# Patient Record
Sex: FEMALE | Race: WHITE | NOT HISPANIC OR LATINO | Employment: FULL TIME | URBAN - METROPOLITAN AREA
[De-identification: names, ages, dates, MRNs, and addresses within clinical notes are randomized per-mention and may not be internally consistent; named-entity substitution may affect disease eponyms.]

---

## 2017-01-19 DIAGNOSIS — N95.1 SYMPTOMATIC MENOPAUSAL OR FEMALE CLIMACTERIC STATES: Primary | ICD-10-CM

## 2017-01-19 RX ORDER — CITALOPRAM HYDROBROMIDE 20 MG/1
20 TABLET ORAL DAILY
Qty: 90 TABLET | Refills: 1 | Status: SHIPPED | OUTPATIENT
Start: 2017-01-19 | End: 2017-07-20

## 2017-01-19 NOTE — TELEPHONE ENCOUNTER
Prescription approved per [facility] Refill Protocol or patient Primary care provider (PCP)  DARLING Mckeon RN/Abraham Benitez

## 2017-05-18 ENCOUNTER — TRANSFERRED RECORDS (OUTPATIENT)
Dept: HEALTH INFORMATION MANAGEMENT | Facility: CLINIC | Age: 47
End: 2017-05-18

## 2017-06-22 ENCOUNTER — OFFICE VISIT (OUTPATIENT)
Dept: FAMILY MEDICINE | Facility: CLINIC | Age: 47
End: 2017-06-22
Payer: OTHER MISCELLANEOUS

## 2017-06-22 VITALS
TEMPERATURE: 97.8 F | WEIGHT: 192.4 LBS | DIASTOLIC BLOOD PRESSURE: 68 MMHG | BODY MASS INDEX: 32.06 KG/M2 | HEIGHT: 65 IN | HEART RATE: 68 BPM | SYSTOLIC BLOOD PRESSURE: 120 MMHG

## 2017-06-22 DIAGNOSIS — S83.271S COMPLEX TEAR OF LATERAL MENISCUS OF RIGHT KNEE AS CURRENT INJURY, SEQUELA: ICD-10-CM

## 2017-06-22 DIAGNOSIS — Z01.818 PREOP GENERAL PHYSICAL EXAM: Primary | ICD-10-CM

## 2017-06-22 LAB — HGB BLD-MCNC: 12.9 G/DL (ref 11.7–15.7)

## 2017-06-22 PROCEDURE — 99214 OFFICE O/P EST MOD 30 MIN: CPT | Performed by: NURSE PRACTITIONER

## 2017-06-22 PROCEDURE — 85018 HEMOGLOBIN: CPT | Performed by: NURSE PRACTITIONER

## 2017-06-22 PROCEDURE — 36415 COLL VENOUS BLD VENIPUNCTURE: CPT | Performed by: NURSE PRACTITIONER

## 2017-06-22 NOTE — PROGRESS NOTES
[facility]  [address] [address]  [phone]  Dept: [phone]    PRE-OP EVALUATION:  Today's date: 2017    Becky Crane (: 1970) presents for pre-operative evaluation assessment as requested by Dr. Solis.  She requires evaluation and anesthesia risk assessment prior to undergoing surgery/procedure for treatment of right knee DJD. Meniscal tear,  .  Proposed procedure: partial right knee replacement    Date of Surgery/ Procedure: 17  Time of Surgery/ Procedure: 1:00pm  Hospital/Surgical Facility: [facility]  Fax number for surgical facility: [phone]  Primary Physician: Maricarmen Peoples  Type of Anesthesia Anticipated: General    Patient has a Health Care Directive or Living Will:  NO    1. NO - Do you have a history of heart attack, stroke, stent, bypass or surgery on an artery in the head, neck, heart or legs?  2. NO - Do you ever have any pain or discomfort in your chest?  3. NO - Do you have a history of  Heart Failure?  4. NO - Are you troubled by shortness of breath when: walking on the level, up a slight hill or at night?  5. NO - Do you currently have a cold, bronchitis or other respiratory infection?  6. NO - Do you have a cough, shortness of breath or wheezing?  7. NO - Do you sometimes get pains in the calves of your legs when you walk?  8. NO - Do you or anyone in your family have previous history of blood clots?  9. NO - Do you or does anyone in your family have a serious bleeding problem such as prolonged bleeding following surgeries or cuts?  10. NO - Have you ever had problems with anemia or been told to take iron pills?  11. NO - Have you had any abnormal blood loss such as black, tarry or bloody stools, or abnormal vaginal bleeding?  12. NO - Have you ever had a blood transfusion?  13. NO - Have you or any of your relatives ever had problems with anesthesia?  14. YES - Do you have sleep apnea, excessive  snoring or daytime drowsiness?snoring  15. NO - Do you have any prosthetic heart valves?  16. NO - Do you have prosthetic joints?  17. NO - Is there any chance that you may be pregnant?      HPI:                                                      Brief HPI related to upcoming procedure: has had a lot of right knee pain .    does have a lot of arthritis and recurrent meniscal tear.        See problem list for active medical problems.  Problems all longstanding and stable, except as noted/documented.  See ROS for pertinent symptoms related to these conditions.                                                                                                  .    MEDICAL HISTORY:                                                      Patient Active Problem List    Diagnosis Date Noted     Family history of diabetes mellitus 08/17/2016     Priority: Medium     Right knee meniscal tear 10/01/2014     Priority: Medium     Vitamin D deficiency 06/27/2014     Priority: Medium     Problem list name updated by automated process. Provider to review       Symptomatic menopausal or female climacteric states 06/27/2014     Priority: Medium     Environmental allergies 06/27/2014     Priority: Medium     CARDIOVASCULAR SCREENING; LDL GOAL LESS THAN 160 10/31/2010     Priority: Medium     Acute reaction to stress 08/29/2007     Priority: Medium     Problem list name updated by automated process. 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     Past Surgical History:   Procedure Laterality Date     C NONSPECIFIC PROCEDURE      tubes in ears, had brain damage during the surgery due to anesthetic complication     C NONSPECIFIC PROCEDURE      knee surgery x 2     CHOLECYSTECTOMY, LAPOROSCOPIC  2007     "Cholecystectomy, Laparoscopic     TONSILLECTOMY      as a child     Current Outpatient Prescriptions   Medication Sig Dispense Refill     citalopram (CELEXA) 20 MG tablet Take 1 tablet (20 mg) by mouth daily 90 tablet 1     OMEPRAZOLE PO        aspirin 81 MG tablet Take 81 mg by mouth daily       OTC products: no recent use of OTC ASA, NSAIDS or Steroids    Allergies   Allergen Reactions     Ivp Dye [Contrast Dye]       Latex Allergy: NO    Social History   Substance Use Topics     Smoking status: Never Smoker     Smokeless tobacco: Never Used     Alcohol use No     History   Drug Use No       REVIEW OF SYSTEMS:                                                    C: NEGATIVE for fever, chills, change in weight  I: NEGATIVE for worrisome rashes, moles or lesions  E: NEGATIVE for vision changes or irritation  E/M: NEGATIVE for ear, mouth and throat problems  R: NEGATIVE for significant cough or SOB  CV: NEGATIVE for chest pain, palpitations or peripheral edema  GI: NEGATIVE for nausea, abdominal pain, heartburn, or change in bowel habits  : NEGATIVE for frequency, dysuria, or hematuria  MUSCULOSKELETAL:POSITIVE  for joint pain right knee pain   N: NEGATIVE for weakness, dizziness or paresthesias  E: NEGATIVE for temperature intolerance, skin/hair changes  H: NEGATIVE for bleeding problems  P: NEGATIVE for changes in mood or affect    EXAM:                                                    /68 (BP Location: Left arm, Patient Position: Chair, Cuff Size: Adult Regular)  Pulse 68  Temp 97.8  F (36.6  C) (Tympanic)  Ht 5' 4.75\" (1.645 m)  Wt 192 lb 6.4 oz (87.3 kg)  LMP  (LMP Unknown)  BMI 32.26 kg/m2    GENERAL APPEARANCE: healthy, alert and no distress     EYES: Eyes grossly normal to inspection, PERRL and eyes track well      HENT: ear canals and TM's normal, nose and mouth without ulcers or lesions, oral mucous membranes moist, oropharynx clear and teeth in good repair      NECK: no adenopathy, no " asymmetry, masses, or scars and thyroid normal to palpation     RESP: lungs clear to auscultation - no rales, rhonchi or wheezes     CV: regular rates and rhythm, normal S1 S2, no S3 or S4 and no murmur, click or rub     ABDOMEN:  soft, nontender, no HSM or masses and bowel sounds normal     MS: right knee pain .       SKIN: no suspicious lesions or rashes     NEURO: Normal strength and tone, sensory exam grossly normal, mentation intact, speech normal, cranial nerves 2-12 intact, Romberg negative and proprioception normal     PSYCH: mentation appears normal. and affect normal/bright     LYMPHATICS: No axillary, cervical, or supraclavicular nodes    DIAGNOSTICS:                                                    Hemoglobin (indicated for history of anemia or procedure with significant blood loss such as tonsillectomy, major intraperitoneal surgery, vascular surgery, major spine surgery, total joint replacement) 12.0    Recent Labs   Lab Test  08/17/16   1224  09/29/15   1405  10/01/14   1408  06/27/14   1422   HGB   --   13.7  14.1   --    NA  137   --    --   139   POTASSIUM  4.6   --    --   4.2   CR  0.74   --    --   0.78   A1C  5.3   --    --    --         IMPRESSION:                                                    Reason for surgery/procedure:  treatment of right knee DJD. Meniscal tear,  .  Proposed procedure: partial right knee replacement      The proposed surgical procedure is considered INTERMEDIATE risk.    REVISED CARDIAC RISK INDEX  The patient has the following serious cardiovascular risks for perioperative complications such as (MI, PE, VFib and 3  AV Block):  No serious cardiac risks  INTERPRETATION: 0 risks: Class I (very low risk - 0.4% complication rate)    The patient has the following additional risks for perioperative complications:  No identified additional risks    ASSESSMENT/PLAN:      ICD-10-CM    1. Preop general physical exam Z01.818 Hemoglobin   2. Complex tear of lateral meniscus of  right knee as current injury, sequela S83.271S        Patient Instructions     Before Your Surgery      Call your surgeon if there is any change in your health. This includes signs of a cold or flu (such as a sore throat, runny nose, cough, rash or fever).    Do not smoke, drink alcohol or take over the counter medicine (unless your surgeon or primary care doctor tells you to) for the 24 hours before and after surgery.    If you take prescribed drugs: Follow your doctor s orders about which medicines to take and which to stop until after surgery.    Eating and drinking prior to surgery: follow the instructions from your surgeon    Take a shower or bath the night before surgery. Use the soap your surgeon gave you to gently clean your skin. If you do not have soap from your surgeon, use your regular soap. Do not shave or scrub the surgery site.  Wear clean pajamas and have clean sheets on your bed.     Be well rested and well hydrated the night before surgery                 RECOMMENDATIONS:                                                          --Patient is to take all scheduled medications on the day of surgery EXCEPT for modifications listed below.    APPROVAL GIVEN to proceed with proposed procedure, without further diagnostic evaluation       Signed Electronically by: RAJIV BARRERA NP, APRN CNP    Copy of this evaluation report is provided to requesting physician.    [facility] Preop Guidelines

## 2017-06-22 NOTE — NURSING NOTE
"Chief Complaint   Patient presents with     Pre-Op Exam       Initial /68 (BP Location: Left arm, Patient Position: Chair, Cuff Size: Adult Regular)  Pulse 68  Temp 97.8  F (36.6  C) (Tympanic)  Ht 5' 4.75\" (1.645 m)  Wt 192 lb 6.4 oz (87.3 kg)  LMP  (LMP Unknown)  BMI 32.26 kg/m2 Estimated body mass index is 32.26 kg/(m^2) as calculated from the following:    Height as of this encounter: 5' 4.75\" (1.645 m).    Weight as of this encounter: 192 lb 6.4 oz (87.3 kg).  Medication Reconciliation: complete     April ELIAN Arango      "

## 2017-06-22 NOTE — MR AVS SNAPSHOT
After Visit Summary   6/22/2017    Becky Crane    MRN: [id number]           Patient Information     Date Of Birth          1970        Visit Information        Provider Department      6/22/2017 4:00 PM Maricarmen Peoples APRN [facility]        Today's Diagnoses     Preop general physical exam    -  1    Complex tear of lateral meniscus of right knee as current injury, sequela          Care Instructions      Before Your Surgery      Call your surgeon if there is any change in your health. This includes signs of a cold or flu (such as a sore throat, runny nose, cough, rash or fever).    Do not smoke, drink alcohol or take over the counter medicine (unless your surgeon or primary care doctor tells you to) for the 24 hours before and after surgery.    If you take prescribed drugs: Follow your doctor s orders about which medicines to take and which to stop until after surgery.    Eating and drinking prior to surgery: follow the instructions from your surgeon    Take a shower or bath the night before surgery. Use the soap your surgeon gave you to gently clean your skin. If you do not have soap from your surgeon, use your regular soap. Do not shave or scrub the surgery site.  Wear clean pajamas and have clean sheets on your bed.     Be well rested and well hydrated the night before surgery               Follow-ups after your visit        Who to contact     Normal or non-critical lab and imaging results will be communicated to you by MyChart, letter or phone within 4 business days after the clinic has received the results. If you do not hear from us within 7 days, please contact the clinic through MyChart or phone. If you have a critical or abnormal lab result, we will notify you by phone as soon as possible.  Submit refill requests through [employer] or call your pharmacy and they will forward the refill request to us. Please allow 3 business days for your refill to be  "completed.          If you need to speak with a  for additional information , please call: [phone]           Additional Information About Your Visit        [employer] Information     "[employer] gives you secure access to your electronic health record. If you see a primary care provider, you can also send messages to your care team and make appointments. If you have questions, please call your primary care clinic.  If you do not have a primary care provider, please call [phone] and they will assist you.        Care EveryWhere ID     This is your Care EveryWhere ID. This could be used by other organizations to access your [place] medical records  [id number]        Your Vitals Were     Pulse Temperature Height Last Period BMI (Body Mass Index)       68 97.8  F (36.6  C) (Tympanic) 5' 4.75\" (1.645 m) (LMP Unknown) 32.26 kg/m2        Blood Pressure from Last 3 Encounters:   06/22/17 120/68   08/17/16 128/74   06/08/16 124/70    Weight from Last 3 Encounters:   06/22/17 192 lb 6.4 oz (87.3 kg)   08/17/16 194 lb 6.4 oz (88.2 kg)   06/08/16 193 lb (87.5 kg)              We Performed the Following     Hemoglobin        Primary Care Provider Office Phone # Fax #    [facility] [phone]       [facility] [address] [place] [address]        Equal Access to Services     DEJUAN GARYA : Hadii emma hansono Sodon, waaxda luqadaha, qaybta kaalmada eulaliayarebecca, clifford herbert. So [facility] [phone].    ATENCIÓN: Si habla español, tiene a easley disposición servicios gratuitos de asistencia lingüística. Llame al [phone].    We comply with applicable federal civil rights laws and [place] laws. We do not discriminate on the basis of race, color, national origin, age, disability sex, sexual orientation or gender identity.            Thank you!     Thank you for choosing [facility]  for your care. Our goal is " always to provide you with excellent care. Hearing back from our patients is one way we can continue to improve our services. Please take a few minutes to complete the written survey that you may receive in the mail after your visit with us. Thank you!             Your Updated Medication List - Protect others around you: Learn how to safely use, store and throw away your medicines at www.disposemymeds.org.          This list is accurate as of: 6/22/17  4:59 PM.  Always use your most recent med list.                   Brand Name Dispense Instructions for use Diagnosis    aspirin 81 MG tablet      Take 81 mg by mouth daily        citalopram 20 MG tablet    celeXA    90 tablet    Take 1 tablet (20 mg) by mouth daily    Symptomatic menopausal or female climacteric states       OMEPRAZOLE PO

## 2017-06-22 NOTE — PATIENT INSTRUCTIONS
Before Your Surgery      Call your surgeon if there is any change in your health. This includes signs of a cold or flu (such as a sore throat, runny nose, cough, rash or fever).    Do not smoke, drink alcohol or take over the counter medicine (unless your surgeon or primary care doctor tells you to) for the 24 hours before and after surgery.    If you take prescribed drugs: Follow your doctor s orders about which medicines to take and which to stop until after surgery.    Eating and drinking prior to surgery: follow the instructions from your surgeon    Take a shower or bath the night before surgery. Use the soap your surgeon gave you to gently clean your skin. If you do not have soap from your surgeon, use your regular soap. Do not shave or scrub the surgery site.  Wear clean pajamas and have clean sheets on your bed.     Be well rested and well hydrated the night before surgery

## 2017-06-26 ENCOUNTER — TRANSFERRED RECORDS (OUTPATIENT)
Dept: HEALTH INFORMATION MANAGEMENT | Facility: CLINIC | Age: 47
End: 2017-06-26

## 2017-07-11 ENCOUNTER — TRANSFERRED RECORDS (OUTPATIENT)
Dept: HEALTH INFORMATION MANAGEMENT | Facility: CLINIC | Age: 47
End: 2017-07-11

## 2017-07-20 DIAGNOSIS — N95.1 SYMPTOMATIC MENOPAUSAL OR FEMALE CLIMACTERIC STATES: ICD-10-CM

## 2017-07-20 NOTE — TELEPHONE ENCOUNTER
Citalopram  20mg     Last Written Prescription Date: 01/19/2017 #90 x 1  Last filled 04/24/2017  Last Office Visit with [facility] primary care provider:  06/22/2017 PK Peoples        Last PHQ-9 score on record= No flowsheet data found.

## 2017-07-21 RX ORDER — CITALOPRAM HYDROBROMIDE 20 MG/1
TABLET ORAL
Qty: 90 TABLET | Refills: 2 | Status: SHIPPED | OUTPATIENT
Start: 2017-07-21 | End: 2018-07-11

## 2017-07-21 NOTE — TELEPHONE ENCOUNTER
Prescription approved per [facility] Refill Protocol.  Mikki Davidson RN

## 2017-08-15 ENCOUNTER — TRANSFERRED RECORDS (OUTPATIENT)
Dept: HEALTH INFORMATION MANAGEMENT | Facility: CLINIC | Age: 47
End: 2017-08-15

## 2017-08-24 ENCOUNTER — OFFICE VISIT (OUTPATIENT)
Dept: FAMILY MEDICINE | Facility: CLINIC | Age: 47
End: 2017-08-24
Payer: OTHER MISCELLANEOUS

## 2017-08-24 VITALS
HEIGHT: 65 IN | BODY MASS INDEX: 32.65 KG/M2 | DIASTOLIC BLOOD PRESSURE: 72 MMHG | HEART RATE: 80 BPM | WEIGHT: 196 LBS | SYSTOLIC BLOOD PRESSURE: 110 MMHG | TEMPERATURE: 96.7 F

## 2017-08-24 DIAGNOSIS — G89.18 POSTOPERATIVE PAIN OF RIGHT KNEE: ICD-10-CM

## 2017-08-24 DIAGNOSIS — M25.561 POSTOPERATIVE PAIN OF RIGHT KNEE: ICD-10-CM

## 2017-08-24 DIAGNOSIS — Z01.818 PREOP GENERAL PHYSICAL EXAM: Primary | ICD-10-CM

## 2017-08-24 DIAGNOSIS — L90.5 SCAR TISSUE: ICD-10-CM

## 2017-08-24 LAB — HGB BLD-MCNC: 12.6 G/DL (ref 11.7–15.7)

## 2017-08-24 PROCEDURE — 85018 HEMOGLOBIN: CPT | Performed by: NURSE PRACTITIONER

## 2017-08-24 PROCEDURE — 99214 OFFICE O/P EST MOD 30 MIN: CPT | Performed by: NURSE PRACTITIONER

## 2017-08-24 PROCEDURE — 36415 COLL VENOUS BLD VENIPUNCTURE: CPT | Performed by: NURSE PRACTITIONER

## 2017-08-24 NOTE — PATIENT INSTRUCTIONS
Before Your Surgery      Call your surgeon if there is any change in your health. This includes signs of a cold or flu (such as a sore throat, runny nose, cough, rash or fever).    Do not smoke, drink alcohol or take over the counter medicine (unless your surgeon or primary care doctor tells you to) for the 24 hours before and after surgery.    If you take prescribed drugs: Follow your doctor s orders about which medicines to take and which to stop until after surgery.    Eating and drinking prior to surgery: follow the instructions from your surgeon    Take a shower or bath the night before surgery. Use the soap your surgeon gave you to gently clean your skin. If you do not have soap from your surgeon, use your regular soap. Do not shave or scrub the surgery site.  Wear clean pajamas and have clean sheets on your bed.     Be well rested and well hydrated the night before surgery     Brush your teeth in the am and rinse mouth  NO  Fluids or foods in the AM

## 2017-08-24 NOTE — NURSING NOTE
"Chief Complaint   Patient presents with     Pre-Op Exam       Initial /72  Pulse 80  Temp 96.7  F (35.9  C) (Tympanic)  Ht 5' 5\" (1.651 m)  Wt 196 lb (88.9 kg)  BMI 32.62 kg/m2 Estimated body mass index is 32.62 kg/(m^2) as calculated from the following:    Height as of this encounter: 5' 5\" (1.651 m).    Weight as of this encounter: 196 lb (88.9 kg).  Medication Reconciliation: camron Grimaldo CMA      "

## 2017-08-24 NOTE — PROGRESS NOTES
[facility]  [address] [address]  [phone]  Dept: [phone]    PRE-OP EVALUATION:  Today's date: 2017    Becky Crane (: 1970) presents for pre-operative evaluation assessment as requested by Dr. Solis .  She requires evaluation and anesthesia risk assessment prior to undergoing surgery/procedure for treatment of right knee pain due to scar tissue  .  Proposed procedure: right knee manipulation to break the scar tissue     Date of Surgery/ Procedure: 17  Time of Surgery/ Procedure: 7am  H[facility]Surgical Facility: [facility]  Fax number for surgical facility:   Primary Physician: Maricarmen Peoples  Type of Anesthesia Anticipated: to be determined    Patient has a Health Care Directive or Living Will:  NO    1. NO - Do you have a history of heart attack, stroke, stent, bypass or surgery on an artery in the head, neck, heart or legs?  2. NO - Do you ever have any pain or discomfort in your chest?  3. NO - Do you have a history of  Heart Failure?  4. NO - Are you troubled by shortness of breath when: walking on the level, up a slight hill or at night?  5. NO - Do you currently have a cold, bronchitis or other respiratory infection?  6. NO - Do you have a cough, shortness of breath or wheezing?  7. NO - Do you sometimes get pains in the calves of your legs when you walk?  8. NO - Do you or anyone in your family have previous history of blood clots?  9. NO - Do you or does anyone in your family have a serious bleeding problem such as prolonged bleeding following surgeries or cuts?  10. NO - Have you ever had problems with anemia or been told to take iron pills?  11. NO - Have you had any abnormal blood loss such as black, tarry or bloody stools, or abnormal vaginal bleeding?  12. NO - Have you ever had a blood transfusion?  13. NO - Have you or any of your relatives ever had problems with anesthesia?  14. NO - Do you have sleep apnea,  excessive snoring or daytime drowsiness?  15. NO - Do you have any prosthetic heart valves?  16. Yes - Do you have prosthetic joints?  17. NO - Is there any chance that you may be pregnant?        HPI:                                                      Brief HPI related to upcoming procedure: right knee partial knee replacement and has been having problems with  Flexion and extension.   She needs to have manipulation to break the scar tissue       See problem list for active medical problems.  Problems all longstanding and stable, except as noted/documented.  See ROS for pertinent symptoms related to these conditions.                                                                                                  .    MEDICAL HISTORY:                                                    Patient Active Problem List    Diagnosis Date Noted     Family history of diabetes mellitus 08/17/2016     Priority: Medium     Right knee meniscal tear 10/01/2014     Priority: Medium     Vitamin D deficiency 06/27/2014     Priority: Medium     Problem list name updated by automated process. Provider to review       Symptomatic menopausal or female climacteric states 06/27/2014     Priority: Medium     Environmental allergies 06/27/2014     Priority: Medium     CARDIOVASCULAR SCREENING; LDL GOAL LESS THAN 160 10/31/2010     Priority: Medium     Acute reaction to stress 08/29/2007     Priority: Medium     Problem list name updated by automated process. 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     Past Surgical History:   Procedure Laterality Date     C NONSPECIFIC PROCEDURE      tubes in ears, had brain damage during the surgery due to anesthetic complication     C NONSPECIFIC PROCEDURE       "knee surgery x 2     CHOLECYSTECTOMY, LAPOROSCOPIC  2007    Cholecystectomy, Laparoscopic     TONSILLECTOMY      as a child     Current Outpatient Prescriptions   Medication Sig Dispense Refill     citalopram (CELEXA) 20 MG tablet TAKE 1 TABLET BY MOUTH DAILY 90 tablet 2     OMEPRAZOLE PO        aspirin 81 MG tablet Take 81 mg by mouth daily       OTC products: no recent use of OTC ASA, NSAIDS or Steroids    Allergies   Allergen Reactions     Iodine Hives     Ivp Dye [Contrast Dye]       Latex Allergy: NO    Social History   Substance Use Topics     Smoking status: Never Smoker     Smokeless tobacco: Never Used     Alcohol use No     History   Drug Use No       REVIEW OF SYSTEMS:                                                    C: NEGATIVE for fever, chills, change in weight  INTEGUMENTARY/SKIN: NEGATIVE for worrisome rashes, moles or lesions  EYES: NEGATIVE for vision changes or irritation  E/M: NEGATIVE for ear, mouth and throat problems  R: NEGATIVE for significant cough or SOB  CV: NEGATIVE for chest pain, palpitations or peripheral edema  GI: NEGATIVE for nausea, abdominal pain, heartburn, or change in bowel habits  : negative for urinary issues  MUSCULOSKELETAL: POSITIVE  for right knee pain   NEURO: NEGATIVE for weakness, dizziness or paresthesias  ENDOCRINE: NEGATIVE for temperature intolerance, skin/hair changes  PSYCHIATRIC: NEGATIVE for changes in mood or affect    EXAM:                                                    /72  Pulse 80  Temp 96.7  F (35.9  C) (Tympanic)  Ht 5' 5\" (1.651 m)  Wt 196 lb (88.9 kg)  BMI 32.62 kg/m2    GENERAL APPEARANCE: healthy, alert and no distress     EYES: Eyes grossly normal to inspection, PERRL and eyes track well      HENT: ear canals and TM's normal, nose and mouth without ulcers or lesions and teeth in good repair      NECK: no adenopathy, no asymmetry, masses, or scars and thyroid normal to palpation     RESP: lungs clear to auscultation - no rales, " rhonchi or wheezes     CV: regular rates and rhythm, normal S1 S2, no S3 or S4 and no murmur, click or rub     ABDOMEN:  soft, nontender, no HSM or masses and bowel sounds normal     MS: extremities normal- no gross deformities noted, no evidence of inflammation in joints, FROM in all extremities.     SKIN: no suspicious lesions or rashes     NEURO: Normal strength and tone, sensory exam grossly normal, mentation intact, speech normal, cranial nerves 2-12 intact, Romberg negative, rapid alternating movements normal and proprioception normal     PSYCH: mentation appears normal. and affect normal/bright     LYMPHATICS: No axillary, cervical, or supraclavicular nodes    DIAGNOSTICS:                                                    Hemoglobin (indicated for history of anemia or procedure with significant blood loss such as tonsillectomy, major intraperitoneal surgery, vascular surgery, major spine surgery, total joint replacement) 12.6    Recent Labs   Lab Test  06/22/17   1614  08/17/16   1224  09/29/15   1405   06/27/14   1422   HGB  12.9   --   13.7   < >   --    NA   --   137   --    --   139   POTASSIUM   --   4.6   --    --   4.2   CR   --   0.74   --    --   0.78   A1C   --   5.3   --    --    --     < > = values in this interval not displayed.        IMPRESSION:                                                    Reason for surgery/procedure: right knee pain and limited ROM after partial knee replacement due to scar tissue     The proposed surgical procedure is considered LOW risk.    REVISED CARDIAC RISK INDEX  The patient has the following serious cardiovascular risks for perioperative complications such as (MI, PE, VFib and 3  AV Block):  No serious cardiac risks  INTERPRETATION: 0 risks: Class I (very low risk - 0.4% complication rate)    The patient has the following additional risks for perioperative complications:  No identified additional risks    ASSESSMENT/PLAN:      ICD-10-CM    1. Preop general  physical exam Z01.818 Hemoglobin   2. Postoperative pain of right knee M25.561     G89.18    3. Scar tissue L90.5        Patient Instructions     Before Your Surgery      Call your surgeon if there is any change in your health. This includes signs of a cold or flu (such as a sore throat, runny nose, cough, rash or fever).    Do not smoke, drink alcohol or take over the counter medicine (unless your surgeon or primary care doctor tells you to) for the 24 hours before and after surgery.    If you take prescribed drugs: Follow your doctor s orders about which medicines to take and which to stop until after surgery.    Eating and drinking prior to surgery: follow the instructions from your surgeon    Take a shower or bath the night before surgery. Use the soap your surgeon gave you to gently clean your skin. If you do not have soap from your surgeon, use your regular soap. Do not shave or scrub the surgery site.  Wear clean pajamas and have clean sheets on your bed.     Be well rested and well hydrated the night before surgery     Brush your teeth in the am and rinse mouth  NO  Fluids or foods in the AM             RECOMMENDATIONS:                                                          --she will hold her medication in the AM      APPROVAL GIVEN to proceed with proposed procedure, without further diagnostic evaluation       Signed Electronically by: RAJIV BARRERA NP, APRN CNP    Copy of this evaluation report is provided to requesting physician.    [facility] Preop Guidelines

## 2017-08-24 NOTE — MR AVS SNAPSHOT
After Visit Summary   8/24/2017    Becky Crane    MRN: [id number]           Patient Information     Date Of Birth          1970        Visit Information        Provider Department      8/24/2017 11:20 AM Maricarmen Peoples APRN [facility]        Today's Diagnoses     Preop general physical exam    -  1    Postoperative pain of right knee        Scar tissue          Care Instructions      Before Your Surgery      Call your surgeon if there is any change in your health. This includes signs of a cold or flu (such as a sore throat, runny nose, cough, rash or fever).    Do not smoke, drink alcohol or take over the counter medicine (unless your surgeon or primary care doctor tells you to) for the 24 hours before and after surgery.    If you take prescribed drugs: Follow your doctor s orders about which medicines to take and which to stop until after surgery.    Eating and drinking prior to surgery: follow the instructions from your surgeon    Take a shower or bath the night before surgery. Use the soap your surgeon gave you to gently clean your skin. If you do not have soap from your surgeon, use your regular soap. Do not shave or scrub the surgery site.  Wear clean pajamas and have clean sheets on your bed.     Be well rested and well hydrated the night before surgery     Brush your teeth in the am and rinse mouth  NO  Fluids or foods in the AM           Follow-ups after your visit        Who to contact     Normal or non-critical lab and imaging results will be communicated to you by [employer], letter or phone within 4 business days after the clinic has received the results. If you do not hear from us within 7 days, please contact the clinic through [employer] or phone. If you have a critical or abnormal lab result, we will notify you by phone as soon as possible.  Submit refill requests through [employer] or call your pharmacy and they will forward the refill request to us.  "Please allow 3 business days for your refill to be completed.          If you need to speak with a  for additional information , please call: [phone]           Additional Information About Your Visit        [employer] Information     [employer] gives you secure access to your electronic health record. If you see a primary care provider, you can also send messages to your care team and make appointments. If you have questions, please call your primary care clinic.  If you do not have a primary care provider, please call [phone] and they will assist you.        Care EveryWhere ID     This is your Care EveryWhere ID. This could be used by other organizations to access your [place] medical records  [id number]        Your Vitals Were     Pulse Temperature Height BMI (Body Mass Index)          80 96.7  F (35.9  C) (Tympanic) 5' 5\" (1.651 m) 32.62 kg/m2         Blood Pressure from Last 3 Encounters:   08/24/17 110/72   06/22/17 120/68   08/17/16 128/74    Weight from Last 3 Encounters:   08/24/17 196 lb (88.9 kg)   06/22/17 192 lb 6.4 oz (87.3 kg)   08/17/16 194 lb 6.4 oz (88.2 kg)              We Performed the Following     Hemoglobin        Primary Care Provider Office Phone # Fax #    [facility] [phone] [address] [place] [address]        Equal Access to Services     [facility]: Hadii aad ku hadasho Soomaali, waaxda luqadaha, qaybta kaalmada adeegyada, waxay nader wylie . So [facility] [phone].    ATENCIÓN: Si habla español, tiene a easley disposición servicios gratuitos de asistencia lingüística. Llame al [phone].    We comply with applicable federal civil rights laws and [place] laws. We do not discriminate on the basis of race, color, national origin, age, disability sex, sexual orientation or gender identity.            Thank you!     Thank you for choosing [facility] [place]  for your care. Our goal is " always to provide you with excellent care. Hearing back from our patients is one way we can continue to improve our services. Please take a few minutes to complete the written survey that you may receive in the mail after your visit with us. Thank you!             Your Updated Medication List - Protect others around you: Learn how to safely use, store and throw away your medicines at www.disposemymeds.org.          This list is accurate as of: 8/24/17 12:26 PM.  Always use your most recent med list.                   Brand Name Dispense Instructions for use Diagnosis    aspirin 81 MG tablet      Take 81 mg by mouth daily        citalopram 20 MG tablet    celeXA    90 tablet    TAKE 1 TABLET BY MOUTH DAILY    Symptomatic menopausal or female climacteric states       OMEPRAZOLE PO

## 2017-08-25 ENCOUNTER — TRANSFERRED RECORDS (OUTPATIENT)
Dept: HEALTH INFORMATION MANAGEMENT | Facility: CLINIC | Age: 47
End: 2017-08-25

## 2017-09-02 ENCOUNTER — HEALTH MAINTENANCE LETTER (OUTPATIENT)
Age: 47
End: 2017-09-02

## 2017-09-05 ENCOUNTER — TRANSFERRED RECORDS (OUTPATIENT)
Dept: HEALTH INFORMATION MANAGEMENT | Facility: CLINIC | Age: 47
End: 2017-09-05

## 2017-09-19 ENCOUNTER — TRANSFERRED RECORDS (OUTPATIENT)
Dept: HEALTH INFORMATION MANAGEMENT | Facility: CLINIC | Age: 47
End: 2017-09-19

## 2017-10-17 ENCOUNTER — TRANSFERRED RECORDS (OUTPATIENT)
Dept: HEALTH INFORMATION MANAGEMENT | Facility: CLINIC | Age: 47
End: 2017-10-17

## 2017-11-18 ENCOUNTER — TELEPHONE (OUTPATIENT)
Dept: FAMILY MEDICINE | Facility: CLINIC | Age: 47
End: 2017-11-18

## 2017-11-21 ENCOUNTER — TRANSFERRED RECORDS (OUTPATIENT)
Dept: HEALTH INFORMATION MANAGEMENT | Facility: CLINIC | Age: 47
End: 2017-11-21

## 2018-02-06 ENCOUNTER — TRANSFERRED RECORDS (OUTPATIENT)
Dept: HEALTH INFORMATION MANAGEMENT | Facility: CLINIC | Age: 48
End: 2018-02-06

## 2018-04-17 ENCOUNTER — TRANSFERRED RECORDS (OUTPATIENT)
Dept: HEALTH INFORMATION MANAGEMENT | Facility: CLINIC | Age: 48
End: 2018-04-17

## 2018-05-15 ENCOUNTER — TRANSFERRED RECORDS (OUTPATIENT)
Dept: HEALTH INFORMATION MANAGEMENT | Facility: CLINIC | Age: 48
End: 2018-05-15

## 2018-06-22 ENCOUNTER — TELEPHONE (OUTPATIENT)
Dept: FAMILY MEDICINE | Facility: CLINIC | Age: 48
End: 2018-06-22

## 2018-06-22 ENCOUNTER — TRANSFERRED RECORDS (OUTPATIENT)
Dept: HEALTH INFORMATION MANAGEMENT | Facility: CLINIC | Age: 48
End: 2018-06-22

## 2018-06-22 NOTE — TELEPHONE ENCOUNTER
Sounds like she needs an appointment. Maricarmen do you want to add order or have ortho physician do this or see patient? Neelam Tsai RN

## 2018-06-22 NOTE — TELEPHONE ENCOUNTER
I left a message for her to call back. Why does she want a CRP? She is due to see Maricarmen in August . I will not add this order in so when you find out why it is needed please forward to Maricarmen. Neelam Tsai RN

## 2018-06-22 NOTE — TELEPHONE ENCOUNTER
Pt reports that the ortho doctor told her to make sure there is no infection in her knee that may be causing the swelling.    Kaykay [place], [place]

## 2018-06-25 NOTE — TELEPHONE ENCOUNTER
Per Maricarmen -  Patient needs to be seen in clinic. Please make an appointment.  Shannan Maya RN

## 2018-06-26 NOTE — TELEPHONE ENCOUNTER
Patient called back. She is very upset and is not going to come in for an appointment. She will go to another provider to have the test ordered. Patient hung up.  Shannan Maya RN

## 2018-06-27 DIAGNOSIS — M25.561 RIGHT KNEE PAIN: Primary | ICD-10-CM

## 2018-06-27 LAB
BASOPHILS # BLD AUTO: 0.1 10E9/L (ref 0–0.2)
BASOPHILS NFR BLD AUTO: 1 %
CRP SERPL-MCNC: 10.3 MG/L (ref 0–8)
DIFFERENTIAL METHOD BLD: NORMAL
EOSINOPHIL # BLD AUTO: 0.2 10E9/L (ref 0–0.7)
EOSINOPHIL NFR BLD AUTO: 3.2 %
ERYTHROCYTE [DISTWIDTH] IN BLOOD BY AUTOMATED COUNT: 13.2 % (ref 10–15)
ERYTHROCYTE [SEDIMENTATION RATE] IN BLOOD BY WESTERGREN METHOD: 18 MM/H (ref 0–30)
HCT VFR BLD AUTO: 40 % (ref 35–47)
HGB BLD-MCNC: 13.6 G/DL (ref 11.7–15.7)
LYMPHOCYTES # BLD AUTO: 1.7 10E9/L (ref 0.8–5.3)
LYMPHOCYTES NFR BLD AUTO: 33.8 %
MCH RBC QN AUTO: 28.8 PG (ref 26.5–33)
MCHC RBC AUTO-ENTMCNC: 34 G/DL (ref 31.5–36.5)
MCV RBC AUTO: 85 FL (ref 78–100)
MONOCYTES # BLD AUTO: 0.4 10E9/L (ref 0–1.3)
MONOCYTES NFR BLD AUTO: 8.2 %
NEUTROPHILS # BLD AUTO: 2.7 10E9/L (ref 1.6–8.3)
NEUTROPHILS NFR BLD AUTO: 53.8 %
PLATELET # BLD AUTO: 214 10E9/L (ref 150–450)
RBC # BLD AUTO: 4.72 10E12/L (ref 3.8–5.2)
WBC # BLD AUTO: 5 10E9/L (ref 4–11)

## 2018-06-27 PROCEDURE — 85652 RBC SED RATE AUTOMATED: CPT | Performed by: PHYSICIAN ASSISTANT

## 2018-06-27 PROCEDURE — 36415 COLL VENOUS BLD VENIPUNCTURE: CPT | Performed by: PHYSICIAN ASSISTANT

## 2018-06-27 PROCEDURE — 85025 COMPLETE CBC W/AUTO DIFF WBC: CPT | Performed by: PHYSICIAN ASSISTANT

## 2018-06-27 PROCEDURE — 86140 C-REACTIVE PROTEIN: CPT | Performed by: PHYSICIAN ASSISTANT

## 2018-07-11 DIAGNOSIS — N95.1 SYMPTOMATIC MENOPAUSAL OR FEMALE CLIMACTERIC STATES: ICD-10-CM

## 2018-07-11 RX ORDER — CITALOPRAM HYDROBROMIDE 20 MG/1
TABLET ORAL
Qty: 90 TABLET | Refills: 0 | Status: SHIPPED | OUTPATIENT
Start: 2018-07-11 | End: 2018-10-28

## 2018-07-11 NOTE — TELEPHONE ENCOUNTER
Medication is being filled for 1 time refill only due to:   Over due for office visit and/or labs   DARLING Mckeon  RN/Abraham Benitez

## 2018-07-11 NOTE — TELEPHONE ENCOUNTER
"Requested Prescriptions   Pending Prescriptions Disp Refills     citalopram (CELEXA) 20 MG tablet [Pharmacy Med Name: Citalopram Hydrobromide Oral Tablet 20 MG] 90 tablet 0    Last Written Prescription Date:  7/21/17  Last Fill Quantity: 90,  # refills: 2   Last office visit: 8/24/2017 with prescribing provider:  8/24/17 gallo   Future Office Visit:     Sig: TAKE ONE TABLET BY MOUTH ONE TIME DAILY    SSRIs Protocol Passed    7/11/2018  9:50 AM       Passed - Recent (12 mo) or future (30 days) visit within the authorizing provider's specialty    Patient had office visit in the last 12 months or has a visit in the next 30 days with authorizing provider or within the authorizing provider's specialty.  See \"Patient Info\" tab in inbasket, or \"Choose Columns\" in Meds & Orders section of the refill encounter.           Passed - Patient is age 18 or older       Passed - No active pregnancy on record       Passed - No positive pregnancy test in last 12 months          "

## 2018-08-02 ENCOUNTER — OFFICE VISIT (OUTPATIENT)
Dept: FAMILY MEDICINE | Facility: CLINIC | Age: 48
End: 2018-08-02
Payer: OTHER MISCELLANEOUS

## 2018-08-02 VITALS
BODY MASS INDEX: 32.52 KG/M2 | SYSTOLIC BLOOD PRESSURE: 124 MMHG | WEIGHT: 195.2 LBS | HEART RATE: 84 BPM | DIASTOLIC BLOOD PRESSURE: 72 MMHG | HEIGHT: 65 IN | TEMPERATURE: 97.7 F

## 2018-08-02 DIAGNOSIS — Z01.818 PREOP GENERAL PHYSICAL EXAM: Primary | ICD-10-CM

## 2018-08-02 LAB — HGB BLD-MCNC: 14.3 G/DL (ref 11.7–15.7)

## 2018-08-02 PROCEDURE — 99214 OFFICE O/P EST MOD 30 MIN: CPT | Performed by: NURSE PRACTITIONER

## 2018-08-02 PROCEDURE — 85018 HEMOGLOBIN: CPT | Performed by: NURSE PRACTITIONER

## 2018-08-02 PROCEDURE — 36415 COLL VENOUS BLD VENIPUNCTURE: CPT | Performed by: NURSE PRACTITIONER

## 2018-08-02 ASSESSMENT — PAIN SCALES - GENERAL: PAINLEVEL: MODERATE PAIN (5)

## 2018-08-02 NOTE — PROGRESS NOTES
[facility]  [address] [address]  [phone]  Dept: [phone]    PRE-OP EVALUATION:  Today's date: 2018    Becky Crane (: 1970) presents for pre-operative evaluation assessment as requested by Dr. Cannon.  She requires evaluation and anesthesia risk assessment prior to undergoing surgery/procedure for treatment of right knee scar tissue , unable to completely straighten her right knee  .    Proposed Surgery/ Procedure: debride   scar tissue   Date of Surgery/ Procedure: 8/10/18  Time of Surgery/ Procedure: [facility]  Hospital/Surgical Facility: [facility]  Fax number for surgical facility:   Primary Physician: Maricarmen Peoples  Type of Anesthesia Anticipated: General    Patient has a Health Care Directive or Living Will:  NO    1. NO - Do you have a history of heart attack, stroke, stent, bypass or surgery on an artery in the head, neck, heart or legs?  2. NO - Do you ever have any pain or discomfort in your chest?  3. NO - Do you have a history of  Heart Failure?  4. NO - Are you troubled by shortness of breath when: walking on the level, up a slight hill or at night?  5. NO - Do you currently have a cold, bronchitis or other respiratory infection?  6. NO - Do you have a cough, shortness of breath or wheezing?  7. NO - Do you sometimes get pains in the calves of your legs when you walk?  8. NO - Do you or anyone in your family have previous history of blood clots?  9. NO - Do you or does anyone in your family have a serious bleeding problem such as prolonged bleeding following surgeries or cuts?  10. NO - Have you ever had problems with anemia or been told to take iron pills?  11. NO - Have you had any abnormal blood loss such as black, tarry or bloody stools, or abnormal vaginal bleeding?  12. NO - Have you ever had a blood transfusion?  13. NO - Have you or any of your relatives ever had problems with anesthesia?  14. NO - Do you have  sleep apnea, excessive snoring or daytime drowsiness?  15. NO - Do you have any prosthetic heart valves?  16. YES - Do you have prosthetic joints? Partial right knee replacement 2017  17. NO - Is there any chance that you may be pregnant?      HPI:     HPI related to upcoming procedure: did have partial right knee replacement 2017 and since then she has had right knee pain with difficulty straightening her knee       See problem list for active medical problems.  Problems all longstanding and stable, except as noted/documented.  See ROS for pertinent symptoms related to these conditions.                                                                                                                                                          .    MEDICAL HISTORY:     Patient Active Problem List    Diagnosis Date Noted     Family history of diabetes mellitus 08/17/2016     Priority: Medium     Right knee meniscal tear 10/01/2014     Priority: Medium     Vitamin D deficiency 06/27/2014     Priority: Medium     Problem list name updated by automated process. Provider to review       Symptomatic menopausal or female climacteric states 06/27/2014     Priority: Medium     Environmental allergies 06/27/2014     Priority: Medium     CARDIOVASCULAR SCREENING; LDL GOAL LESS THAN 160 10/31/2010     Priority: Medium     Acute reaction to stress 08/29/2007     Priority: Medium     Problem list name updated by automated process. 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     Past Surgical History:   Procedure Laterality Date     C NONSPECIFIC PROCEDURE      tubes in ears, had brain damage during the surgery due to anesthetic complication     C NONSPECIFIC PROCEDURE      knee surgery x 2      "CHOLECYSTECTOMY, LAPOROSCOPIC  2007    Cholecystectomy, Laparoscopic     TONSILLECTOMY      as a child     Current Outpatient Prescriptions   Medication Sig Dispense Refill     citalopram (CELEXA) 20 MG tablet TAKE ONE TABLET BY MOUTH ONE TIME DAILY  90 tablet 0     OMEPRAZOLE PO Take by mouth every other day        aspirin 81 MG tablet Take 81 mg by mouth daily       OTC products: no recent use of OTC ASA, NSAIDS or Steroids    Allergies   Allergen Reactions     Iodine Hives     Ivp Dye [Contrast Dye]       Latex Allergy: NO    Social History   Substance Use Topics     Smoking status: Never Smoker     Smokeless tobacco: Never Used     Alcohol use No     History   Drug Use No       REVIEW OF SYSTEMS:   CONSTITUTIONAL: NEGATIVE for fever, chills, change in weight  INTEGUMENTARY/SKIN: NEGATIVE for worrisome rashes, moles or lesions  EYES: NEGATIVE for vision changes or irritation  ENT/MOUTH: NEGATIVE for ear, mouth and throat problems  RESP: NEGATIVE for significant cough or SOB  CV: NEGATIVE for chest pain, palpitations or peripheral edema  GI: NEGATIVE for nausea, abdominal pain, heartburn, or change in bowel habits  : NEGATIVE for frequency, dysuria, or hematuria  MUSCULOSKELETAL:POSITIVE  for joint pain right knee pain   NEURO: NEGATIVE for weakness, dizziness or paresthesias  HEME: NEGATIVE for bleeding problems  PSYCHIATRIC: NEGATIVE for changes in mood or affect    EXAM:   /72 (BP Location: Right arm, Patient Position: Chair, Cuff Size: Adult Regular)  Pulse 84  Temp 97.7  F (36.5  C) (Oral)  Ht 5' 5.12\" (1.654 m)  Wt 195 lb 3.2 oz (88.5 kg)  LMP 12/13/2016 (Approximate)  Breastfeeding? No  BMI 32.37 kg/m2    GENERAL APPEARANCE: healthy, alert and no distress     EYES: Eyes grossly normal to inspection, PERRL and eyes track well      HENT: ear canals and TM's normal, nose and mouth without ulcers or lesions, oral mucous membranes moist, oropharynx clear and teeth in good repair, no chips      " NECK: no adenopathy, no asymmetry, masses, or scars and thyroid normal to palpation     RESP: lungs clear to auscultation - no rales, rhonchi or wheezes     CV: regular rates and rhythm, normal S1 S2, no S3 or S4 and no murmur, click or rub     ABDOMEN:  soft, nontender, no HSM or masses and bowel sounds normal     MS: extremities normal- no gross deformities noted, no evidence of inflammation in joints, FROM in all extremities.     SKIN: no suspicious lesions or rashes     NEURO: mentation intact, speech normal, oriented times 3, cranial nerves 2-12 intact, Romberg negative, rapid alternating movements normal and proprioception normal     PSYCH: mentation appears normal. and affect normal/bright     LYMPHATICS: No cervical adenopathy     DIAGNOSTICS:   Hemoglobin (indicated for history of anemia or procedure with significant blood loss such as tonsillectomy, major intraperitoneal surgery, vascular surgery, major spine surgery, total joint replacement)  14.3    Recent Labs   Lab Test  06/27/18   1353  08/24/17   1116   08/17/16   1224   06/27/14   1422   HGB  13.6  12.6   < >   --    < >   --    PLT  214   --    --    --    --    --    NA   --    --    --   137   --   139   POTASSIUM   --    --    --   4.6   --   4.2   CR   --    --    --   0.74   --   0.78   A1C   --    --    --   5.3   --    --     < > = values in this interval not displayed.        IMPRESSION:   Reason for surgery/procedure: right knee pain s/p ,  parietal debridement     The proposed surgical procedure is considered LOW risk.    REVISED CARDIAC RISK INDEX  The patient has the following serious cardiovascular risks for perioperative complications such as (MI, PE, VFib and 3  AV Block):  No serious cardiac risks  INTERPRETATION: 0 risks: Class I (very low risk - 0.4% complication rate)    The patient has the following additional risks for perioperative complications:  No identified additional risks      ICD-10-CM    1. Preop general physical exam  Z01.818 Hemoglobin       RECOMMENDATIONS:       Cardiovascular Risk  Performs 4 METs exercise without symptoms (Light housework (dusting, washing dishes) and Climb a flight of stairs) .       --Patient is to take all scheduled medications on the day of surgery EXCEPT for modifications listed below.    APPROVAL GIVEN to proceed with proposed procedure, without further diagnostic evaluation       Signed Electronically by: RAJIV BARRERA NP, APRN CNP    Copy of this evaluation report is provided to requesting physician.    [facility] Preop Guidelines    Revised Cardiac Risk Index

## 2018-08-02 NOTE — MR AVS SNAPSHOT
After Visit Summary   8/2/2018    Becky Crane    MRN: [id number]           Patient Information     Date Of Birth          1970        Visit Information        Provider Department      8/2/2018 2:00 PM Maricarmen Peoples APRN [facility]        Today's Diagnoses     Preop general physical exam    -  1      Care Instructions      Before Your Surgery      Call your surgeon if there is any change in your health. This includes signs of a cold or flu (such as a sore throat, runny nose, cough, rash or fever).    Do not smoke, drink alcohol or take over the counter medicine (unless your surgeon or primary care doctor tells you to) for the 24 hours before and after surgery.    If you take prescribed drugs: Follow your doctor s orders about which medicines to take and which to stop until after surgery.    Eating and drinking prior to surgery: follow the instructions from your surgeon    Take a shower or bath the night before surgery. Use the soap your surgeon gave you to gently clean your skin. If you do not have soap from your surgeon, use your regular soap. Do not shave or scrub the surgery site.  Wear clean pajamas and have clean sheets on your bed.       Be well rested and well hydrated the night before surgery               Follow-ups after your visit        Who to contact     Normal or non-critical lab and imaging results will be communicated to you by [employer], letter or phone within 4 business days after the clinic has received the results. If you do not hear from us within 7 days, please contact the clinic through [employer] or phone. If you have a critical or abnormal lab result, we will notify you by phone as soon as possible.  Submit refill requests through [employer] or call your pharmacy and they will forward the refill request to us. Please allow 3 business days for your refill to be completed.          If you need to speak with a  for additional  "information , please call: [phone]           Additional Information About Your Visit        MyChart Information     [employer] gives you secure access to your electronic health record. If you see a primary care provider, you can also send messages to your care team and make appointments. If you have questions, please call your primary care clinic.  If you do not have a primary care provider, please call [phone] and they will assist you.        Care EveryWhere ID     This is your Care EveryWhere ID. This could be used by other organizations to access your [facility] medical records  [id number]        Your Vitals Were     Pulse Temperature Height Last Period Breastfeeding? BMI (Body Mass Index)    84 97.7  F (36.5  C) (Oral) 5' 5.12\" (1.654 m) 12/13/2016 (Approximate) No 32.37 kg/m2       Blood Pressure from Last 3 Encounters:   08/02/18 124/72   08/24/17 110/72   06/22/17 120/68    Weight from Last 3 Encounters:   08/02/18 195 lb 3.2 oz (88.5 kg)   08/24/17 196 lb (88.9 kg)   06/22/17 192 lb 6.4 oz (87.3 kg)              We Performed the Following     Hemoglobin        Primary Care Provider Office Phone # Fax #    [facility] [phone] [address] [place] [address]        Equal Access to Services     [facility]: Hadii aad ku hadasho Sodignaali, waaxda luqadaha, qaybta kaalmada adeegyada, clifford wylie . [facility] [phone].    ATENCIÓN: Si habla español, tiene a easley disposición servicios gratuitos de asistencia lingüística. Llame al [phone].    We comply with applicable federal civil rights laws and [place] laws. We do not discriminate on the basis of race, color, national origin, age, disability, sex, sexual orientation, or gender identity.            Thank you!     Thank you for choosing [facility] [place]  for your care. Our goal is always to provide you with excellent care. Hearing back from our patients is one " way we can continue to improve our services. Please take a few minutes to complete the written survey that you may receive in the mail after your visit with us. Thank you!             Your Updated Medication List - Protect others around you: Learn how to safely use, store and throw away your medicines at www.disposemymeds.org.          This list is accurate as of 8/2/18  2:51 PM.  Always use your most recent med list.                   Brand Name Dispense Instructions for use Diagnosis    aspirin 81 MG tablet      Take 81 mg by mouth daily        citalopram 20 MG tablet    celeXA    90 tablet    TAKE ONE TABLET BY MOUTH ONE TIME DAILY    Symptomatic menopausal or female climacteric states       OMEPRAZOLE PO      Take by mouth every other day

## 2018-08-10 ENCOUNTER — TRANSFERRED RECORDS (OUTPATIENT)
Dept: HEALTH INFORMATION MANAGEMENT | Facility: CLINIC | Age: 48
End: 2018-08-10

## 2018-08-21 ENCOUNTER — TRANSFERRED RECORDS (OUTPATIENT)
Dept: HEALTH INFORMATION MANAGEMENT | Facility: CLINIC | Age: 48
End: 2018-08-21

## 2018-09-20 ENCOUNTER — TRANSFERRED RECORDS (OUTPATIENT)
Dept: HEALTH INFORMATION MANAGEMENT | Facility: CLINIC | Age: 48
End: 2018-09-20

## 2018-10-28 DIAGNOSIS — N95.1 SYMPTOMATIC MENOPAUSAL OR FEMALE CLIMACTERIC STATES: ICD-10-CM

## 2018-10-29 ENCOUNTER — RADIANT APPOINTMENT (OUTPATIENT)
Dept: MAMMOGRAPHY | Facility: CLINIC | Age: 48
End: 2018-10-29
Payer: COMMERCIAL

## 2018-10-29 DIAGNOSIS — Z12.31 VISIT FOR SCREENING MAMMOGRAM: ICD-10-CM

## 2018-10-29 PROCEDURE — 77067 SCR MAMMO BI INCL CAD: CPT

## 2018-10-29 RX ORDER — CITALOPRAM HYDROBROMIDE 20 MG/1
TABLET ORAL
Qty: 90 TABLET | Refills: 1 | Status: SHIPPED | OUTPATIENT
Start: 2018-10-29 | End: 2019-07-22

## 2018-10-29 NOTE — TELEPHONE ENCOUNTER
Prescription approved per [facility] Refill Protocol or patient Primary care provider (PCP)  DARLING Mckeon RN/Abraham Benitez

## 2018-10-29 NOTE — TELEPHONE ENCOUNTER
"Requested Prescriptions   Pending Prescriptions Disp Refills     citalopram (CELEXA) 20 MG tablet [Pharmacy Med Name: Citalopram Hydrobromide Oral Tablet 20 MG] 90 tablet 0    Last Written Prescription Date:  07/11/2018 #90 x 0  Last filled 07/11/2018  Last office visit: 8/2/2018 PK Peoples   Future Office Visit:  None   Sig: Take 1 tablet by mouth once daily    SSRIs Protocol Passed    10/28/2018 11:26 AM  No flowsheet data found. (PHQ)    No flowsheet data found. (JONAH)             Passed - Recent (12 mo) or future (30 days) visit within the authorizing provider's specialty    Patient had office visit in the last 12 months or has a visit in the next 30 days with authorizing provider or within the authorizing provider's specialty.  See \"Patient Info\" tab in inbasket, or \"Choose Columns\" in Meds & Orders section of the refill encounter.             Passed - Patient is age 18 or older       Passed - No active pregnancy on record       Passed - No positive pregnancy test in last 12 months        3  "

## 2018-11-29 ENCOUNTER — TRANSFERRED RECORDS (OUTPATIENT)
Dept: HEALTH INFORMATION MANAGEMENT | Facility: CLINIC | Age: 48
End: 2018-11-29

## 2019-01-17 ENCOUNTER — TRANSFERRED RECORDS (OUTPATIENT)
Dept: HEALTH INFORMATION MANAGEMENT | Facility: CLINIC | Age: 49
End: 2019-01-17

## 2019-02-12 ENCOUNTER — TRANSFERRED RECORDS (OUTPATIENT)
Dept: HEALTH INFORMATION MANAGEMENT | Facility: CLINIC | Age: 49
End: 2019-02-12

## 2019-03-06 ENCOUNTER — TRANSFERRED RECORDS (OUTPATIENT)
Dept: HEALTH INFORMATION MANAGEMENT | Facility: CLINIC | Age: 49
End: 2019-03-06

## 2019-03-06 LAB — PHQ9 SCORE: 7

## 2019-04-03 ENCOUNTER — TRANSFERRED RECORDS (OUTPATIENT)
Dept: HEALTH INFORMATION MANAGEMENT | Facility: CLINIC | Age: 49
End: 2019-04-03

## 2019-04-19 ENCOUNTER — TELEPHONE (OUTPATIENT)
Dept: FAMILY MEDICINE | Facility: CLINIC | Age: 49
End: 2019-04-19

## 2019-04-19 NOTE — LETTER
April 19, 2019      Becky Crane  [address] [address]        Dear Becky,     As part of [facility]'s commitment to health and wellness we have reviewed your chart and it indicates that you are due for one or more of the following:    -- Pap smear. The last pap that we have on file for you was from 06/27/2014. These are recommended every 3 years. Please call our clinic to schedule your pap smear / physical appointment with fasting labs. Please plan to be fasting for 8 to 10 hours prior to this appointment (nothing to eat or drink except water and medications).     -- Colon screen. Colonoscopy or FIT test (take home test). One of these tests is recommended at age 50 to screen for colon cancer. The last colonoscopy/FIT that we have on file for you was from 12/21/2017.   Please call one of the following numbers to schedule a colonoscopy:  [facility] [phone]  [facility] [phone]  [facility] [phone]  [facility] [phone] (multiple sites, call for locations)  OR....  If you prefer to do a screening that is LESS INVASIVE AND LESS EXPENSIVE there is an test for you! It is called the FIT test. It is a screening test that is done yearly and can be DONE AT HOME! Do the test at home and mail it in (you don't even have to pay for postage). If you are willing to do this test, we can order the kit for you to  at our clinic. Please call us at [phone] if you need an order for a colonoscopy or FIT testing.    Please try to schedule and/or complete the tests above within the next 2-4 weeks.   The number to call to schedule an appointment at [facility] is [phone].    While we work hard to maintain accurate records, it is always possible that this notice does not accurately reflect tests that you may have had. To ensure that we do not send you unnecessary notices please verify that we have accurate dates of your tests (even if these  were done many years ago) or if you are seeking care at another clinic.      Sincerely,     AMBER Jain/ jazmyne

## 2019-04-19 NOTE — TELEPHONE ENCOUNTER
Panel Management Review      Patient has the following on her problem list: None      Composite cancer screening  Chart review shows that this patient is due/due soon for the following Pap Smear and Colonoscopy  Summary:    Patient is due/failing the following:   COLONOSCOPY; PE with FASTING LABS and PAP    Action needed:   Patient needs office visit for PHYSICAL with PAP and FASTING LABS and Patient needs referral/order: COLONOSCOPY    Type of outreach:    Sent letter.- Does not appear to check [employer] messages.    Questions for provider review:    None                                                                                                                                    Ivone Leon CMA ([facility])       Chart routed to None .

## 2019-04-30 ENCOUNTER — TRANSFERRED RECORDS (OUTPATIENT)
Dept: HEALTH INFORMATION MANAGEMENT | Facility: CLINIC | Age: 49
End: 2019-04-30

## 2019-05-28 ENCOUNTER — TRANSFERRED RECORDS (OUTPATIENT)
Dept: HEALTH INFORMATION MANAGEMENT | Facility: CLINIC | Age: 49
End: 2019-05-28

## 2019-06-17 ENCOUNTER — OFFICE VISIT (OUTPATIENT)
Dept: FAMILY MEDICINE | Facility: CLINIC | Age: 49
End: 2019-06-17
Payer: OTHER MISCELLANEOUS

## 2019-06-17 VITALS
HEART RATE: 72 BPM | RESPIRATION RATE: 16 BRPM | TEMPERATURE: 98 F | HEIGHT: 65 IN | WEIGHT: 190.4 LBS | BODY MASS INDEX: 31.72 KG/M2 | SYSTOLIC BLOOD PRESSURE: 118 MMHG | DIASTOLIC BLOOD PRESSURE: 76 MMHG

## 2019-06-17 DIAGNOSIS — G89.29 CHRONIC PAIN OF RIGHT KNEE: ICD-10-CM

## 2019-06-17 DIAGNOSIS — M25.561 CHRONIC PAIN OF RIGHT KNEE: ICD-10-CM

## 2019-06-17 DIAGNOSIS — Z01.818 PREOP GENERAL PHYSICAL EXAM: Primary | ICD-10-CM

## 2019-06-17 LAB — HGB BLD-MCNC: 14 G/DL (ref 11.7–15.7)

## 2019-06-17 PROCEDURE — 85018 HEMOGLOBIN: CPT | Performed by: NURSE PRACTITIONER

## 2019-06-17 PROCEDURE — 36415 COLL VENOUS BLD VENIPUNCTURE: CPT | Performed by: NURSE PRACTITIONER

## 2019-06-17 PROCEDURE — 99214 OFFICE O/P EST MOD 30 MIN: CPT | Performed by: NURSE PRACTITIONER

## 2019-06-17 PROCEDURE — 93000 ELECTROCARDIOGRAM COMPLETE: CPT | Performed by: NURSE PRACTITIONER

## 2019-06-17 ASSESSMENT — MIFFLIN-ST. JEOR: SCORE: 1479.52

## 2019-06-17 ASSESSMENT — PAIN SCALES - GENERAL: PAINLEVEL: WORST PAIN (10)

## 2019-06-17 NOTE — PROGRESS NOTES
[facility]  [address] [address]  [phone]  Dept: [phone]    PRE-OP EVALUATION:  Today's date: 2019    Becky Crane (: 1970) presents for pre-operative evaluation assessment as requested by Dr. Echeverria .  She requires evaluation and anesthesia risk assessment prior to undergoing surgery/procedure for treatment of    .failed partial replacement right knee     Proposed Surgery/ Procedure: radiofrequency ablation  Date of Surgery/ Procedure: 19  Time of Surgery/ Procedure: 0900AM  Hospital/Surgical Facility: [facility]  Fax number for surgical facility: [phone]  Primary Physician: Maricarmen Peoples  Type of Anesthesia Anticipated: to be determined    Patient has a Health Care Directive or Living Will:  NO    1. NO - Do you have a history of heart attack, stroke, stent, bypass or surgery on an artery in the head, neck, heart or legs?  2. NO - Do you ever have any pain or discomfort in your chest?  3. NO - Do you have a history of  Heart Failure?  4. NO - Are you troubled by shortness of breath when: walking on the level, up a slight hill or at night?  5. NO - Do you currently have a cold, bronchitis or other respiratory infection?  6. NO - Do you have a cough, shortness of breath or wheezing?  7. NO - Do you sometimes get pains in the calves of your legs when you walk?  8. NO - Do you or anyone in your family have previous history of blood clots?  9. NO - Do you or does anyone in your family have a serious bleeding problem such as prolonged bleeding following surgeries or cuts?  10. NO - Have you ever had problems with anemia or been told to take iron pills?  11. NO - Have you had any abnormal blood loss such as black, tarry or bloody stools, or abnormal vaginal bleeding?  12. NO - Have you ever had a blood transfusion?  13. NO - Have you or any of your relatives ever had problems with anesthesia?  14. NO - Do you have  sleep apnea, excessive snoring or daytime drowsiness?  15. NO - Do you have any prosthetic heart valves?  16. NO - Do you have prosthetic joints?  17. NO - Is there any chance that you may be pregnant?      HPI:     HPI related to upcoming procedure: failed partial replacement right knee   Hx of partial right knee replaced , then developed scare tissue and developed bone spurs.    The pain has increased since the removal of the bone spur and scar tissue     See problem list for active medical problems.  Problems all longstanding and stable, except as noted/documented.  See ROS for pertinent symptoms related to these conditions.      MEDICAL HISTORY:     Patient Active Problem List    Diagnosis Date Noted     Family history of diabetes mellitus 08/17/2016     Priority: Medium     Right knee meniscal tear 10/01/2014     Priority: Medium     Vitamin D deficiency 06/27/2014     Priority: Medium     Problem list name updated by automated process. Provider to review       Symptomatic menopausal or female climacteric states 06/27/2014     Priority: Medium     Environmental allergies 06/27/2014     Priority: Medium     CARDIOVASCULAR SCREENING; LDL GOAL LESS THAN 160 10/31/2010     Priority: Medium     Acute reaction to stress 08/29/2007     Priority: Medium     Problem list name updated by automated process. 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     Past Surgical History:   Procedure Laterality Date     C NONSPECIFIC PROCEDURE      tubes in ears, had brain damage during the surgery due to anesthetic complication     C NONSPECIFIC PROCEDURE      knee surgery x 2     CHOLECYSTECTOMY, LAPOROSCOPIC  2007    Cholecystectomy, Laparoscopic     TONSILLECTOMY      as a child     Current  "Outpatient Medications   Medication Sig Dispense Refill     aspirin 81 MG tablet Take 81 mg by mouth daily       citalopram (CELEXA) 20 MG tablet Take 1 tablet by mouth once daily 90 tablet 1     OMEPRAZOLE PO Take by mouth every other day        OTC products: no recent use of OTC ASA, NSAIDS or Steroids    Allergies   Allergen Reactions     Iodine Hives     Ivp Dye [Contrast Dye]       Latex Allergy: NO    Social History     Tobacco Use     Smoking status: Never Smoker     Smokeless tobacco: Never Used   Substance Use Topics     Alcohol use: No     History   Drug Use No       REVIEW OF SYSTEMS:   CONSTITUTIONAL: NEGATIVE for fever, chills, change in weight  INTEGUMENTARY/SKIN: NEGATIVE for worrisome rashes, moles or lesions  EYES: NEGATIVE for vision changes or irritation  ENT/MOUTH: NEGATIVE for ear, mouth and throat problems  RESP: NEGATIVE for significant cough or SOB  CV: NEGATIVE for chest pain, palpitations or peripheral edema  GI: NEGATIVE for nausea, abdominal pain, heartburn, or change in bowel habits and POSITIVE for start with intermittent diarrhea that started on Sunday ,  Is getting better.    : NEGATIVE for frequency, dysuria, or hematuria  MUSCULOSKELETAL:POSITIVE  for joint pain right knee pain   NEURO: NEGATIVE for weakness, dizziness or paresthesias  ENDOCRINE: NEGATIVE for temperature intolerance, skin/hair changes  HEME: NEGATIVE for bleeding problems  PSYCHIATRIC: NEGATIVE for changes in mood or affect    EXAM:   /76 (BP Location: Left arm, Patient Position: Chair, Cuff Size: Adult Regular)   Pulse 72   Temp 98  F (36.7  C) (Tympanic)   Resp 16   Ht 1.643 m (5' 4.69\")   Wt 86.4 kg (190 lb 6.4 oz)   LMP 12/13/2016 (Approximate)   BMI 31.99 kg/m      GENERAL APPEARANCE: healthy, alert and no distress     EYES: Eyes grossly normal to inspection, PERRL and eye track well      HENT: ear canals and TM's normal, nose and mouth without ulcers or lesions, oral mucous membranes moist, " oropharynx clear and teeth in good repair      NECK: no adenopathy, no asymmetry, masses, or scars and thyroid normal to palpation     RESP: lungs clear to auscultation - no rales, rhonchi or wheezes     CV: regular rates and rhythm, normal S1 S2, no S3 or S4 and no murmur, click or rub     ABDOMEN:  soft, nontender, no HSM or masses and bowel sounds normal     MS: extremities normal- no gross deformities noted, no evidence of inflammation in joints, FROM in all extremities.     SKIN: no suspicious lesions or rashes     NEURO: mentation intact, speech normal, oriented times 3, cranial nerves 2-12 intact, Romberg negative, rapid alternating movements normal and proprioception normal     PSYCH: mentation appears normal. 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  NO history of chest pain .tolerates activity there are no prior tracings available  Hemoglobin (indicated for history of anemia or procedure with significant blood loss such as tonsillectomy, major intraperitoneal surgery, vascular surgery, major spine surgery, total joint replacement) 14.0    Recent Labs   Lab Test 08/02/18  1451 06/27/18  1353  08/17/16  1224  06/27/14  1422   HGB 14.3 13.6   < >  --    < >  --    PLT  --  214  --   --   --   --    NA  --   --   --  137  --  139   POTASSIUM  --   --   --  4.6  --  4.2   CR  --   --   --  0.74  --  0.78   A1C  --   --   --  5.3  --   --     < > = values in this interval not displayed.        IMPRESSION:   Reason for surgery/procedure: revision right knee partial replacement ,radiofrequency ablation    The proposed surgical procedure is considered LOW risk.    REVISED CARDIAC RISK INDEX  The patient has the following serious cardiovascular risks for perioperative complications such as (MI, PE, VFib and 3  AV  Block):  No serious cardiac risks  INTERPRETATION: 0 risks: Class I (very low risk - 0.4% complication rate)    The patient has the following additional risks for perioperative complications:  No identified additional risks    ASSESSMENT/PLAN:      ICD-10-CM    1. Preop general physical exam Z01.818 EKG 12-lead complete w/read - Clinics     Hemoglobin   2. Chronic pain of right knee M25.561     G89.29        Patient Instructions   Before Your Surgery      Call your surgeon if there is any change in your health. This includes signs of a cold or flu (such as a sore throat, runny nose, cough, rash or fever).    Do not smoke, drink alcohol or take over the counter medicine (unless your surgeon or primary care doctor tells you to) for the 24 hours before and after surgery.    If you take prescribed drugs: Follow your doctor s orders about which medicines to take and which to stop until after surgery.    Eating and drinking prior to surgery: follow the instructions from your surgeon    Take a shower or bath the night before surgery. Use the soap your surgeon gave you to gently clean your skin. If you do not have soap from your surgeon, use your regular soap. Do not shave or scrub the surgery site.  Wear clean pajamas and have clean sheets on your bed.         ASSESSMENT/PLAN:      ICD-10-CM    1. Preop general physical exam Z01.818 EKG 12-lead complete w/read - Clinics     Hemoglobin   2. Chronic pain of right knee M25.561     G89.29                       RECOMMENDATIONS:     {IMPORTANT - Conditions - complete carefully!!:  No known cardiac risk factor     {IMPORTANT - Medications: will take her Celexa the evening before surgery    APPROVAL GIVEN to proceed with proposed procedure, without further diagnostic evaluation       Signed Electronically by: RAJIV BARRERA NP, APRN CNP    Copy of this evaluation report is provided to requesting physician.    [facility] Preop Guidelines    Revised Cardiac Risk Index

## 2019-06-17 NOTE — PATIENT INSTRUCTIONS
Before Your Surgery      Call your surgeon if there is any change in your health. This includes signs of a cold or flu (such as a sore throat, runny nose, cough, rash or fever).    Do not smoke, drink alcohol or take over the counter medicine (unless your surgeon or primary care doctor tells you to) for the 24 hours before and after surgery.    If you take prescribed drugs: Follow your doctor s orders about which medicines to take and which to stop until after surgery.    Eating and drinking prior to surgery: follow the instructions from your surgeon    Take a shower or bath the night before surgery. Use the soap your surgeon gave you to gently clean your skin. If you do not have soap from your surgeon, use your regular soap. Do not shave or scrub the surgery site.  Wear clean pajamas and have clean sheets on your bed.     Be well rested and well hydrated the night before surgery   No fluids/eating the morning of surgery

## 2019-06-17 NOTE — NURSING NOTE
"Initial /76 (BP Location: Left arm, Patient Position: Chair, Cuff Size: Adult Regular)   Pulse 72   Temp 98  F (36.7  C) (Tympanic)   Resp 16   Ht 1.643 m (5' 4.69\")   Wt 86.4 kg (190 lb 6.4 oz)   LMP 12/13/2016 (Approximate)   BMI 31.99 kg/m   Estimated body mass index is 31.99 kg/m  as calculated from the following:    Height as of this encounter: 1.643 m (5' 4.69\").    Weight as of this encounter: 86.4 kg (190 lb 6.4 oz). .    Ivone Leon CMA ([facility])    "

## 2019-07-05 ENCOUNTER — ANCILLARY PROCEDURE (OUTPATIENT)
Dept: GENERAL RADIOLOGY | Facility: CLINIC | Age: 49
End: 2019-07-05
Attending: PHYSICIAN ASSISTANT
Payer: OTHER MISCELLANEOUS

## 2019-07-05 ENCOUNTER — OFFICE VISIT (OUTPATIENT)
Dept: FAMILY MEDICINE | Facility: CLINIC | Age: 49
End: 2019-07-05
Payer: OTHER MISCELLANEOUS

## 2019-07-05 VITALS
OXYGEN SATURATION: 96 % | WEIGHT: 190.4 LBS | BODY MASS INDEX: 31.72 KG/M2 | SYSTOLIC BLOOD PRESSURE: 112 MMHG | DIASTOLIC BLOOD PRESSURE: 72 MMHG | HEART RATE: 92 BPM | RESPIRATION RATE: 20 BRPM | TEMPERATURE: 98.6 F | HEIGHT: 65 IN

## 2019-07-05 DIAGNOSIS — R10.84 ABDOMINAL PAIN, GENERALIZED: ICD-10-CM

## 2019-07-05 DIAGNOSIS — R11.0 NAUSEA: Primary | ICD-10-CM

## 2019-07-05 PROCEDURE — 74019 RADEX ABDOMEN 2 VIEWS: CPT | Mod: FY

## 2019-07-05 PROCEDURE — 99214 OFFICE O/P EST MOD 30 MIN: CPT | Performed by: PHYSICIAN ASSISTANT

## 2019-07-05 RX ORDER — ONDANSETRON 4 MG/1
4 TABLET, FILM COATED ORAL EVERY 8 HOURS PRN
Qty: 18 TABLET | Refills: 0 | Status: SHIPPED | OUTPATIENT
Start: 2019-07-05 | End: 2019-07-12

## 2019-07-05 ASSESSMENT — MIFFLIN-ST. JEOR: SCORE: 1474.6

## 2019-07-05 NOTE — PROGRESS NOTES
Subjective     Becky Crane is a 49 year old female who presents to clinic today for the following health issues:    HPI   Abdominal Pain      Duration: 9 days    Description (location/character/radiation): Generalized       Associated flank pain: None    Intensity:  mild    Accompanying signs and symptoms:        Fever/Chills: no        Gas/Bloating: no        Nausea/vomitting: YES- nausea       Diarrhea: YES- now resolved- was for a couple days prior       Dysuria or Hematuria: no     History (previous similar pain/trauma/previous testing): Chronic abdominal pain prior to surgery- seemed improved    Precipitating or alleviating factors:       Pain worse with eating/BM/urination: YES       Pain relieved by BM: no     Therapies tried and outcome: Pepto bismol, miralax     LMP:  not applicable    Lindsay Love CMA     Had a knee ablation with partial sedation (through the pain clinic).  She continues to have nausea since the procedure.  Diarrhea off and on.  Last bowel movement was this am and normal.  No black in the stool.      She c/o abdominal pain around amadou-umbilical area.   She had constipation after the ablation (likely due to pain meds).  Was on percocet, however finished this.    Decreased appetite.      Pain is a 5/10 that is constant.          Patient Active Problem List   Diagnosis     Esophageal reflux     Rosacea     Acute reaction to stress     CARDIOVASCULAR SCREENING; LDL GOAL LESS THAN 160     Vitamin D deficiency     Symptomatic menopausal or female climacteric states     Environmental allergies     Right knee meniscal tear     Family history of diabetes mellitus     Past Surgical History:   Procedure Laterality Date     C NONSPECIFIC PROCEDURE      tubes in ears, had brain damage during the surgery due to anesthetic complication     C NONSPECIFIC PROCEDURE      knee surgery x 2     CHOLECYSTECTOMY, LAPOROSCOPIC  2007    Cholecystectomy, Laparoscopic     TONSILLECTOMY      as a child      "  Social History     Tobacco Use     Smoking status: Never Smoker     Smokeless tobacco: Never Used   Substance Use Topics     Alcohol use: No     Family History   Problem Relation Age of Onset     C.A.D. Mother      Diabetes Mother      Hypertension Mother      C.A.D. Maternal Grandmother      Hypertension Maternal Grandmother      Asthma No family hx of      Cerebrovascular Disease No family hx of      Breast Cancer No family hx of      Cancer - colorectal No family hx of      Prostate Cancer No family hx of          Current Outpatient Medications   Medication Sig Dispense Refill     citalopram (CELEXA) 20 MG tablet Take 1 tablet by mouth once daily 90 tablet 1     ondansetron (ZOFRAN) 4 MG tablet Take 1 tablet (4 mg) by mouth every 8 hours as needed for nausea Needs to be seen since not feeling better 18 tablet 0     BP Readings from Last 3 Encounters:   07/05/19 112/72   06/17/19 118/76   08/02/18 124/72    Wt Readings from Last 3 Encounters:   07/05/19 86.4 kg (190 lb 6.4 oz)   06/17/19 86.4 kg (190 lb 6.4 oz)   08/02/18 88.5 kg (195 lb 3.2 oz)                      Reviewed and updated as needed this visit by Provider  Tobacco  Allergies  Meds  Problems  Med Hx  Surg Hx  Fam Hx         Review of Systems   ROS COMP: Constitutional, HEENT, cardiovascular, pulmonary, GI, , musculoskeletal, neuro, skin, endocrine and psych systems are negative, except as otherwise noted.      Objective    /72   Pulse 92   Temp 98.6  F (37  C) (Tympanic)   Resp 20   Ht 1.643 m (5' 4.69\")   Wt 86.4 kg (190 lb 6.4 oz)   LMP 12/13/2016 (Approximate)   SpO2 96%   Breastfeeding? No   BMI 31.99 kg/m    Body mass index is 31.99 kg/m .  Physical Exam   GENERAL: healthy, alert and no distress  RESP: lungs clear to auscultation - no rales, rhonchi or wheezes  CV: regular rate and rhythm, normal S1 S2, no S3 or S4, no murmur, click or rub, no peripheral edema and peripheral pulses strong  ABDOMEN: soft, nontender, no " "hepatosplenomegaly, no masses and bowel sounds normal    Diagnostic Test Results:  Labs reviewed in Epic  Results for orders placed or performed in visit on 07/05/19   XR Abdomen 2 Views    Narrative    ABDOMEN TWO VIEWS   7/5/2019 2:41 PM     HISTORY:  Nausea. Abdominal pain, generalized.    FINDINGS: Right upper quadrant surgical clip. Nonspecific bowel gas  pattern.      Impression    IMPRESSION: Unremarkable exam.    CAROLIN RODNEY MD           Assessment & Plan     1. Nausea    Xray ordered to r/o obstruction.  KUB normal.     Patient Instructions   Use zofran for nausea.   Start a fiber supplement (metamucil) to help get your bowels back to normal.   Monitor for vomiting, fevers, or worsening abdominal pain and notify the clinic or go to the ER if needed.      Likely bowels were affected by recent percocet use and procedure, I suggest we give things a little more time to see if it starts to regulate back out.     - XR Abdomen 2 Views    2. Abdominal pain, generalized    - XR Abdomen 2 Views     BMI:   Estimated body mass index is 31.99 kg/m  as calculated from the following:    Height as of this encounter: 1.643 m (5' 4.69\").    Weight as of this encounter: 86.4 kg (190 lb 6.4 oz).               Return in about 2 weeks (around 7/19/2019) for a recheck if symptoms do not improve.    Bailee Funes PA-C  [facility]      "

## 2019-07-05 NOTE — PATIENT INSTRUCTIONS
Use zofran for nausea.   Start a fiber supplement (metamucil) to help get your bowels back to normal.   Monitor for vomiting, fevers, or worsening abdominal pain and notify the clinic or go to the ER if needed.

## 2019-07-05 NOTE — LETTER
[facility]  [address] [place] [address]  Phone: [phone]    July 5, 2019        Becky Crane  [address] [address]          To whom it may concern:    RE: Becky Crane    Patient was seen and treated today at our clinic and missed work.    Please contact me for questions or concerns.      Sincerely,        Bailee Funes PA-C

## 2019-07-09 ENCOUNTER — TRANSFERRED RECORDS (OUTPATIENT)
Dept: HEALTH INFORMATION MANAGEMENT | Facility: CLINIC | Age: 49
End: 2019-07-09

## 2019-07-09 LAB — PHQ9 SCORE: 18

## 2019-07-19 ENCOUNTER — OFFICE VISIT (OUTPATIENT)
Dept: FAMILY MEDICINE | Facility: CLINIC | Age: 49
End: 2019-07-19
Payer: OTHER MISCELLANEOUS

## 2019-07-19 VITALS
HEIGHT: 65 IN | WEIGHT: 194 LBS | OXYGEN SATURATION: 93 % | BODY MASS INDEX: 32.32 KG/M2 | TEMPERATURE: 98.2 F | HEART RATE: 66 BPM | SYSTOLIC BLOOD PRESSURE: 108 MMHG | RESPIRATION RATE: 16 BRPM | DIASTOLIC BLOOD PRESSURE: 68 MMHG

## 2019-07-19 DIAGNOSIS — R10.33 PERIUMBILICAL ABDOMINAL PAIN: Primary | ICD-10-CM

## 2019-07-19 DIAGNOSIS — R11.0 NAUSEA: ICD-10-CM

## 2019-07-19 DIAGNOSIS — Z12.11 SPECIAL SCREENING FOR MALIGNANT NEOPLASMS, COLON: ICD-10-CM

## 2019-07-19 LAB
ALBUMIN SERPL-MCNC: 3.7 G/DL (ref 3.4–5)
ALBUMIN UR-MCNC: NEGATIVE MG/DL
ALP SERPL-CCNC: 122 U/L (ref 40–150)
ALT SERPL W P-5'-P-CCNC: 43 U/L (ref 0–50)
AMYLASE SERPL-CCNC: 69 U/L (ref 30–110)
ANION GAP SERPL CALCULATED.3IONS-SCNC: 8 MMOL/L (ref 3–14)
APPEARANCE UR: CLEAR
AST SERPL W P-5'-P-CCNC: 29 U/L (ref 0–45)
BILIRUB SERPL-MCNC: 0.3 MG/DL (ref 0.2–1.3)
BILIRUB UR QL STRIP: NEGATIVE
BUN SERPL-MCNC: 15 MG/DL (ref 7–30)
CALCIUM SERPL-MCNC: 8.8 MG/DL (ref 8.5–10.1)
CHLORIDE SERPL-SCNC: 106 MMOL/L (ref 94–109)
CO2 SERPL-SCNC: 27 MMOL/L (ref 20–32)
COLOR UR AUTO: YELLOW
CREAT SERPL-MCNC: 0.92 MG/DL (ref 0.52–1.04)
ERYTHROCYTE [DISTWIDTH] IN BLOOD BY AUTOMATED COUNT: 13.5 % (ref 10–15)
GFR SERPL CREATININE-BSD FRML MDRD: 72 ML/MIN/{1.73_M2}
GLUCOSE SERPL-MCNC: 93 MG/DL (ref 70–99)
GLUCOSE UR STRIP-MCNC: NEGATIVE MG/DL
HCT VFR BLD AUTO: 39.5 % (ref 35–47)
HGB BLD-MCNC: 13.5 G/DL (ref 11.7–15.7)
HGB UR QL STRIP: NEGATIVE
KETONES UR STRIP-MCNC: NEGATIVE MG/DL
LEUKOCYTE ESTERASE UR QL STRIP: ABNORMAL
LIPASE SERPL-CCNC: 224 U/L (ref 73–393)
MCH RBC QN AUTO: 29 PG (ref 26.5–33)
MCHC RBC AUTO-ENTMCNC: 34.2 G/DL (ref 31.5–36.5)
MCV RBC AUTO: 85 FL (ref 78–100)
NITRATE UR QL: NEGATIVE
NON-SQ EPI CELLS #/AREA URNS LPF: NORMAL /LPF
PH UR STRIP: 5.5 PH (ref 5–7)
PLATELET # BLD AUTO: 225 10E9/L (ref 150–450)
POTASSIUM SERPL-SCNC: 4 MMOL/L (ref 3.4–5.3)
PROT SERPL-MCNC: 7.6 G/DL (ref 6.8–8.8)
RBC # BLD AUTO: 4.66 10E12/L (ref 3.8–5.2)
RBC #/AREA URNS AUTO: NORMAL /HPF
SODIUM SERPL-SCNC: 141 MMOL/L (ref 133–144)
SOURCE: ABNORMAL
SP GR UR STRIP: 1.02 (ref 1–1.03)
UROBILINOGEN UR STRIP-ACNC: 0.2 EU/DL (ref 0.2–1)
WBC # BLD AUTO: 6.7 10E9/L (ref 4–11)
WBC #/AREA URNS AUTO: NORMAL /HPF

## 2019-07-19 PROCEDURE — 80053 COMPREHEN METABOLIC PANEL: CPT | Performed by: PHYSICIAN ASSISTANT

## 2019-07-19 PROCEDURE — 85027 COMPLETE CBC AUTOMATED: CPT | Performed by: PHYSICIAN ASSISTANT

## 2019-07-19 PROCEDURE — 36415 COLL VENOUS BLD VENIPUNCTURE: CPT | Performed by: PHYSICIAN ASSISTANT

## 2019-07-19 PROCEDURE — 83690 ASSAY OF LIPASE: CPT | Performed by: PHYSICIAN ASSISTANT

## 2019-07-19 PROCEDURE — 81001 URINALYSIS AUTO W/SCOPE: CPT | Performed by: PHYSICIAN ASSISTANT

## 2019-07-19 PROCEDURE — 99214 OFFICE O/P EST MOD 30 MIN: CPT | Performed by: PHYSICIAN ASSISTANT

## 2019-07-19 PROCEDURE — 82150 ASSAY OF AMYLASE: CPT | Performed by: PHYSICIAN ASSISTANT

## 2019-07-19 RX ORDER — DICYCLOMINE HYDROCHLORIDE 10 MG/1
10 CAPSULE ORAL
Qty: 90 CAPSULE | Refills: 1 | Status: SHIPPED | OUTPATIENT
Start: 2019-07-19 | End: 2020-01-22

## 2019-07-19 ASSESSMENT — PAIN SCALES - GENERAL: PAINLEVEL: EXTREME PAIN (8)

## 2019-07-19 ASSESSMENT — MIFFLIN-ST. JEOR: SCORE: 1491.89

## 2019-07-19 NOTE — PROGRESS NOTES
Subjective     Becky Crane is a 49 year old female who presents to clinic today for the following health issues:    HPI   ABDOMINAL   PAIN     Onset: 2.5 weeks    Description:   Character: Dull ache and nausea  Location: amadou-umbilical region  Radiation: None    Intensity: mild, moderate    Progression of Symptoms:  same and constant    Accompanying Signs & Symptoms:  Fever/Chills?: no   Gas/Bloating: no   Nausea: YES  Vomitting: no   Diarrhea?: no   Constipation:no   Dysuria or Hematuria: no    History:   Trauma: no   Previous similar pain: YES   Previous tests done: none    Precipitating factors:   Does the pain change with:     Food: no      BM: no     Urination: no     Alleviating factors:  unknown    Therapies Tried and outcome: zofran - not helping    LMP:  not applicable     She does use medicinal marijuana for sleep (started this June 20th, 2019) through the [facility] pain clinic.  She was on 1 tab and has increased to 3 tabs currently.   She is seeing the pain clinic for her knee pain (had a partial knee replacement and then an ablation).      She no longer has constipation.  Last BM this afternoon was solid (not hard to pass) and had one in the am as well.  Taking a fiber supplement (metamucil) 4x/day.  No blood in the stool.      She has a dull achy pain around the belly button.  Pain is constant and varies in intensity.  She eats 2-3 meals per day.  She has decreased appetite.      She continues to have nausea daily.  Has tried zofran but this doesn't help.  No vomiting.  No headaches.  No changes in vision.     No regular NSAID use.  No blood or black in the stool.  Pain is in the same location, achy.      She has had her gallbladder removed several years ago (this feels different than that pain).     Pain is not related to food. No burning sensation.      No longer with menses for the past 2-3 years.  No spotting.      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 Mother      Diabetes Mother      Hypertension Mother      C.A.D. Maternal Grandmother      Hypertension Maternal Grandmother      Asthma No family hx of      Cerebrovascular Disease No family hx of      Breast Cancer No family hx of      Cancer - colorectal No family hx of      Prostate Cancer No family hx of          Current Outpatient Medications   Medication Sig Dispense Refill     citalopram (CELEXA) 20 MG tablet Take 1 tablet by mouth once daily 90 tablet 1     dicyclomine (BENTYL) 10 MG capsule Take 1 capsule (10 mg) by mouth 3 times daily (with meals) 90 capsule 1     ondansetron (ZOFRAN) 4 MG tablet Take 1 tablet (4 mg) by mouth every 8 hours as needed for nausea Needs to be seen since not feeling better 18 tablet 0     BP Readings from Last 3 Encounters:   07/19/19 108/68   07/05/19 112/72   06/17/19 118/76    Wt Readings from Last 3 Encounters:   07/19/19 88 kg (194 lb)   07/05/19 86.4 kg (190 lb 6.4 oz)   06/17/19 86.4 kg (190 lb 6.4 oz)                      Reviewed and updated as needed this visit by Provider         Review of Systems   ROS COMP: Constitutional, HEENT, cardiovascular, pulmonary, GI, , musculoskeletal, neuro,  "skin, endocrine and psych systems are negative, except as otherwise noted.      Objective    /68 (BP Location: Left arm, Patient Position: Sitting, Cuff Size: Adult Regular)   Pulse 66   Temp 98.2  F (36.8  C) (Tympanic)   Resp 16   Ht 1.645 m (5' 4.75\")   Wt 88 kg (194 lb)   LMP 12/13/2016 (Approximate)   SpO2 93%   Breastfeeding? No   BMI 32.53 kg/m    Body mass index is 32.53 kg/m .  Physical Exam   GENERAL: healthy, alert and no distress  ABDOMEN: soft, nontender, no hepatosplenomegaly, no masses and bowel sounds normal    Diagnostic Test Results:  Labs reviewed in Epic  Results for orders placed or performed in visit on 07/19/19 (from the past 24 hour(s))   CBC with platelets   Result Value Ref Range    WBC 6.7 4.0 - 11.0 10e9/L    RBC Count 4.66 3.8 - 5.2 10e12/L    Hemoglobin 13.5 11.7 - 15.7 g/dL    Hematocrit 39.5 35.0 - 47.0 %    MCV 85 78 - 100 fl    MCH 29.0 26.5 - 33.0 pg    MCHC 34.2 31.5 - 36.5 g/dL    RDW 13.5 10.0 - 15.0 %    Platelet Count 225 150 - 450 10e9/L   *UA reflex to Microscopic and Culture ([place] and [facility] (except [place] and [place])   Result Value Ref Range    Color Urine Yellow     Appearance Urine Clear     Glucose Urine Negative NEG^Negative mg/dL    Bilirubin Urine Negative NEG^Negative    Ketones Urine Negative NEG^Negative mg/dL    Specific Gravity Urine 1.025 1.003 - 1.035    Blood Urine Negative NEG^Negative    pH Urine 5.5 5.0 - 7.0 pH    Protein Albumin Urine Negative NEG^Negative mg/dL    Urobilinogen Urine 0.2 0.2 - 1.0 EU/dL    Nitrite Urine Negative NEG^Negative    Leukocyte Esterase Urine Trace (A) NEG^Negative    Source Midstream Urine    Urine Microscopic   Result Value Ref Range    WBC Urine 0 - 5 OTO5^0 - 5 /HPF    RBC Urine O - 2 OTO2^O - 2 /HPF    Squamous Epithelial /LPF Urine Few FEW^Few /LPF           Assessment & Plan     1. Periumbilical abdominal pain  Patient Instructions   I suggest you cut back on the medicinal marijuana from 3 " "tabs down to 2 tabs and see if this helps with your abdominal pain.      You can also try Bentyl, which should help decrease spasms in the colon.  Use this 3 times per day with meals.     We'll check blood and urine today and if this is all normal and symptoms persist, I suggest you do a colonoscopy (order placed).      - Lipase  - Amylase  - CBC with platelets  - Comprehensive metabolic panel  - *UA reflex to Microscopic and Culture ([place] and [facility] (except [place] and [place])  - dicyclomine (BENTYL) 10 MG capsule; Take 1 capsule (10 mg) by mouth 3 times daily (with meals)  Dispense: 90 capsule; Refill: 1      May consider referral to GI specialist, Dr. Brown, if symptoms persist and labs all normal.   2. Nausea  Will check labs and go from there.   - Urine Microscopic    3. Special screening for malignant neoplasms, colon  I discussed the importance of colorectal cancer screening, including the risks and benefits of the various procedures, including colonoscopy and annual FOB immunoassay test.  She will have colonoscopy done with Dr. Brown at [facility].         - GASTROENTEROLOGY ADULT REF PROCEDURE ONLY Other; [facility] [phone]     BMI:   Estimated body mass index is 32.53 kg/m  as calculated from the following:    Height as of this encounter: 1.645 m (5' 4.75\").    Weight as of this encounter: 88 kg (194 lb).               Return in about 2 weeks (around 8/2/2019) for a recheck if symptoms do not improve.    Bailee Funes PA-C  [facility]        "

## 2019-07-19 NOTE — PATIENT INSTRUCTIONS
I suggest you cut back on the medicinal marijuana from 3 tabs down to 2 tabs and see if this helps with your abdominal pain.      You can also try Bentyl, which should help decrease spasms in the colon.  Use this 3 times per day with meals.     We'll check blood and urine today and if this is all normal and symptoms persist, I suggest you do a colonoscopy (order placed).

## 2019-07-22 DIAGNOSIS — N95.1 SYMPTOMATIC MENOPAUSAL OR FEMALE CLIMACTERIC STATES: ICD-10-CM

## 2019-07-23 RX ORDER — CITALOPRAM HYDROBROMIDE 20 MG/1
TABLET ORAL
Qty: 90 TABLET | Refills: 0 | Status: SHIPPED | OUTPATIENT
Start: 2019-07-23 | End: 2019-12-09

## 2019-07-23 NOTE — TELEPHONE ENCOUNTER
"Requested Prescriptions   Pending Prescriptions Disp Refills     citalopram (CELEXA) 20 MG tablet [Pharmacy Med Name: Citalopram Hydrobromide Oral Tablet 20 MG] 90 tablet 0     Sig: TAKE ONE TABLET BY MOUTH ONE TIME DAILY  Last Written Prescription Date:  10/29/2018 #90 x 1  Last filled 02/08/2019  Last office visit: 7/19/2019 PK Funes   Future Office Visit:  None         SSRIs Protocol Passed - 7/22/2019  8:02 PM  No flowsheet data found. (PHQ)    No flowsheet data found. (JONAH)              Passed - Recent (12 mo) or future (30 days) visit within the authorizing provider's specialty     Patient had office visit in the last 12 months or has a visit in the next 30 days with authorizing provider or within the authorizing provider's specialty.  See \"Patient Info\" tab in inbasket, or \"Choose Columns\" in Meds & Orders section of the refill encounter.              Passed - Medication is active on med list        Passed - Patient is age 18 or older        Passed - No active pregnancy on record        Passed - No positive pregnancy test in last 12 months          "

## 2019-07-23 NOTE — TELEPHONE ENCOUNTER
Routing refill request to provider for review/approval because:  No phq9 score  Selenasa Zhou ROBLES

## 2019-08-21 ENCOUNTER — TRANSFERRED RECORDS (OUTPATIENT)
Dept: HEALTH INFORMATION MANAGEMENT | Facility: CLINIC | Age: 49
End: 2019-08-21

## 2019-09-17 ENCOUNTER — TELEPHONE (OUTPATIENT)
Dept: FAMILY MEDICINE | Facility: CLINIC | Age: 49
End: 2019-09-17

## 2019-09-17 NOTE — TELEPHONE ENCOUNTER
Patient states she needs a letter from you that says you had her take metamucil for constipation do to the medications she on.  She wants work comp to pay for it.    Valerie Downs [facility]

## 2019-09-17 NOTE — LETTER
[facility] [address]  [address] [address]  Phone: [phone]    September 20, 2019        Becky Crane  [address] [address]          To whom it may concern:    RE: Becky Crane    I saw Becky on July 5th, 2019 after her knee ablation and she was having constipation from her recent surgery.  I advised her to start metamucil over the counter and notify me if symptoms did not improve.      Please contact me for questions or concerns.      Sincerely,        Bailee Funes PA-C

## 2019-09-18 ENCOUNTER — TRANSFERRED RECORDS (OUTPATIENT)
Dept: HEALTH INFORMATION MANAGEMENT | Facility: CLINIC | Age: 49
End: 2019-09-18

## 2019-09-18 NOTE — TELEPHONE ENCOUNTER
Please call patient to obtain more info.  How is this related to work comp?    Bailee Funes PA-C

## 2019-09-20 NOTE — TELEPHONE ENCOUNTER
Please call Becky,   Yes, I have printed a letter, please mail it out to her.    Bailee Funes PA-C

## 2019-09-20 NOTE — TELEPHONE ENCOUNTER
Bailee,  Patient is looking for a letter that says you instructed her to take Metamucil 4 times per day with the Dyclomine medication. This is for her . Patient says this is related to the work comp situation from knee/nerve ablation back in June of 2019. Says she made you aware of this. Please advise, patient would like the letter mailed to her.    TRAVIS Haynes

## 2019-09-24 NOTE — TELEPHONE ENCOUNTER
Citalopram  20mg     Last Written Prescription Date: 06/08/2016 #90 x 1  Last filled 10/03/2016  Last Office Visit with [facility] primary care provider:  08/17/2016 PK Peoples        Last PHQ-9 score on record= No flowsheet data found.                 normal (ped)... In no apparent distress, appears well developed and well nourished.

## 2019-09-25 ENCOUNTER — PATIENT OUTREACH (OUTPATIENT)
Dept: FAMILY MEDICINE | Facility: CLINIC | Age: 49
End: 2019-09-25

## 2019-09-25 NOTE — PROGRESS NOTES
Panel Management Review      Patient has the following on her problem list: None      Composite cancer screening  Chart review shows that this patient is due/due soon for the following Pap Smear and Colonoscopy  Summary:    Patient is due/failing the following:   FLU/SHINGRIX VACCINES, PAP and PHYSICAL    Action needed:   Patient needs office visit for MEDICARE ANNUAL WELLNESS EXAM.    Type of outreach:    Sent [employer] message.    Questions for provider review:    None                                                                                                                                    Lindsay Love [facility]     Chart routed to Care Team .

## 2019-11-14 ENCOUNTER — TRANSFERRED RECORDS (OUTPATIENT)
Dept: HEALTH INFORMATION MANAGEMENT | Facility: CLINIC | Age: 49
End: 2019-11-14

## 2019-12-09 DIAGNOSIS — N95.1 SYMPTOMATIC MENOPAUSAL OR FEMALE CLIMACTERIC STATES: ICD-10-CM

## 2019-12-10 RX ORDER — CITALOPRAM HYDROBROMIDE 20 MG/1
20 TABLET ORAL DAILY
Qty: 30 TABLET | Refills: 0 | Status: SHIPPED | OUTPATIENT
Start: 2019-12-10 | End: 2020-02-10

## 2020-01-07 ENCOUNTER — TRANSFERRED RECORDS (OUTPATIENT)
Dept: HEALTH INFORMATION MANAGEMENT | Facility: CLINIC | Age: 50
End: 2020-01-07

## 2020-01-21 NOTE — PROGRESS NOTES
[facility]  [address] [address]  [phone]  Dept: [phone]    PRE-OP EVALUATION:  Today's date: 2020     Becky Crane (: 1970) presents for pre-operative evaluation assessment as requested by Dr. Laith Herrera.  She requires evaluation and anesthesia risk assessment prior to undergoing surgery/procedure for treatment of  Right knee pain .    Proposed Surgery/ Procedure: RIGHT KNEE REVISION UNI TO TOTAL KNEE ARTHROPLASTY  Date of Surgery/ Procedure: 20  Time of Surgery/ Procedure: 8:30 AM  Hospital/Surgical Facility: [facility]         Fax number for surgical facility: [phone], [phone]   Primary Physician: Maricarmen Peoples  Type of Anesthesia Anticipated:     Patient has a Health Care Directive or Living Will:  NO    1. NO - Do you have a history of heart attack, stroke, stent, bypass or surgery on an artery in the head, neck, heart or legs?  2. NO - Do you ever have any pain or discomfort in your chest?  3. NO - Do you have a history of  Heart Failure?  4. NO - Are you troubled by shortness of breath when: walking on the level, up a slight hill or at night?  5. NO - Do you currently have a cold, bronchitis or other respiratory infection?  6. NO - Do you have a cough, shortness of breath or wheezing?  7. NO - Do you sometimes get pains in the calves of your legs when you walk?  8. NO - Do you or anyone in your family have previous history of blood clots?  9. NO - Do you or does anyone in your family have a serious bleeding problem such as prolonged bleeding following surgeries or cuts?  10. NO - Have you ever had problems with anemia or been told to take iron pills?  11. NO - Have you had any abnormal blood loss such as black, tarry or bloody stools, or abnormal vaginal bleeding?  12. NO - Have you ever had a blood transfusion?  13. NO - Have you or any of your relatives ever had problems with  anesthesia?  14. NO - Do you have sleep apnea, excessive snoring or daytime drowsiness?  15. NO - Do you have any prosthetic heart valves?  16. YES - Do you have prosthetic joints? (right knee partial)  17. NO - Is there any chance that you may be pregnant?    Lindsay Love CMA    HPI:     HPI related to upcoming procedure: partial knee replacement in June 2019 (due to medial joint line pain) and pain in this area has improved/resolved.   She went through physical therapy.  However, now pain is in her lateral and anterior aspect of knee therefore full replacement is advised.       DEPRESSION - Patient has a long history of Depression of moderate severity requiring medication for control with recent symptoms being stable..Current symptoms of depression include none.       MEDICAL HISTORY:     Patient Active Problem List    Diagnosis Date Noted     Family history of diabetes mellitus 08/17/2016     Priority: Medium     Right knee meniscal tear 10/01/2014     Priority: Medium     Vitamin D deficiency 06/27/2014     Priority: Medium     Problem list name updated by automated process. Provider to review       Symptomatic menopausal or female climacteric states 06/27/2014     Priority: Medium     Environmental allergies 06/27/2014     Priority: Medium     CARDIOVASCULAR SCREENING; LDL GOAL LESS THAN 160 10/31/2010     Priority: Medium     Acute reaction to stress 08/29/2007     Priority: Medium     Problem list name updated by automated process. 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  This is not a prescription, and is not used to certify that the patient has a qualifying medical condition.)  0     omeprazole (PRILOSEC) 10 MG DR capsule Take 20 mg by mouth daily       OTC products: None, except as noted above    Allergies   Allergen Reactions     Iodine Hives     Ivp Dye [Contrast Dye]       Latex Allergy: NO    Social History     Tobacco Use     Smoking status: Never Smoker     Smokeless tobacco: Never Used   Substance Use Topics     Alcohol use: No     History   Drug Use No       REVIEW OF SYSTEMS:   Constitutional, neuro, ENT, endocrine, pulmonary, cardiac, gastrointestinal, genitourinary, musculoskeletal, integument and psychiatric systems are negative, except as otherwise noted.    EXAM:   /64   Pulse 58   Temp 98.1  F (36.7  C) (Tympanic)   Resp 15   Ht 1.645 m (5' 4.75\")   Wt 87.4 kg (192 lb 9.6 oz)   LMP 12/13/2016 (Approximate)   SpO2 94%   Breastfeeding No   BMI 32.30 kg/m      GENERAL APPEARANCE: healthy, alert and no distress     EYES: EOMI, PERRL     HENT: ear canals and TM's normal and nose and mouth without ulcers or lesions     NECK: no adenopathy, no asymmetry, masses, or scars and thyroid normal to palpation     RESP: lungs clear to auscultation - no rales, rhonchi or wheezes     CV: regular rates and rhythm, normal S1 S2, no S3 or S4 and no murmur, click or rub     ABDOMEN:  soft, nontender, no HSM or masses and bowel sounds normal     MS: extremities normal- no gross deformities noted, no evidence of inflammation in joints, FROM in all extremities.     " SKIN: no suspicious lesions or rashes     NEURO: Normal strength and tone, sensory exam grossly normal, mentation intact and speech normal     PSYCH: mentation appears normal. and affect normal/bright     LYMPHATICS: No cervical adenopathy    DIAGNOSTICS:     EKG: Not indicated due to non-vascular surgery and last ekg on 6/17/19 (within 30 days for CAD history or last year for cardiac risk factors)  Labs Drawn and in Process:   Unresulted Labs Ordered in the Past 30 Days of this Admission     Date and Time Order Name Status Description    1/22/2020 1410 HEMOGLOBIN In process           Recent Labs   Lab Test 07/19/19  1458 06/17/19  1852  06/27/18  1353  08/17/16  1224   HGB 13.5 14.0   < > 13.6   < >  --      --   --  214  --   --      --   --   --   --  137   POTASSIUM 4.0  --   --   --   --  4.6   CR 0.92  --   --   --   --  0.74   A1C  --   --   --   --   --  5.3    < > = values in this interval not displayed.        IMPRESSION:   Reason for surgery/procedure: persistent knee pain right  Diagnosis/reason for consult: preop    The proposed surgical procedure is considered INTERMEDIATE risk.    REVISED CARDIAC RISK INDEX  The patient has the following serious cardiovascular risks for perioperative complications such as (MI, PE, VFib and 3  AV Block):  No serious cardiac risks  INTERPRETATION: 0 risks: Class I (very low risk - 0.4% complication rate)    The patient has the following additional risks for perioperative complications:  No identified additional risks      ICD-10-CM    1. Preop general physical exam Z01.818 Hemoglobin   2. Acute pain of right knee M25.561    3. Complex tear of lateral meniscus of right knee as current injury, sequela S83.271S    4. Work related injury Y99.0        RECOMMENDATIONS:         --Patient is to take all scheduled medications on the day of surgery EXCEPT for modifications listed below.    APPROVAL GIVEN to proceed with proposed procedure, without further diagnostic  evaluation       Signed Electronically by: Bailee Funes PA-C    Copy of this evaluation report is provided to requesting physician.    [facility] Preop Guidelines    Revised Cardiac Risk Index

## 2020-01-22 ENCOUNTER — OFFICE VISIT (OUTPATIENT)
Dept: FAMILY MEDICINE | Facility: CLINIC | Age: 50
End: 2020-01-22
Payer: OTHER MISCELLANEOUS

## 2020-01-22 VITALS
BODY MASS INDEX: 32.09 KG/M2 | OXYGEN SATURATION: 94 % | RESPIRATION RATE: 15 BRPM | SYSTOLIC BLOOD PRESSURE: 104 MMHG | WEIGHT: 192.6 LBS | TEMPERATURE: 98.1 F | HEIGHT: 65 IN | HEART RATE: 58 BPM | DIASTOLIC BLOOD PRESSURE: 64 MMHG

## 2020-01-22 DIAGNOSIS — S83.271S COMPLEX TEAR OF LATERAL MENISCUS OF RIGHT KNEE AS CURRENT INJURY, SEQUELA: ICD-10-CM

## 2020-01-22 DIAGNOSIS — M25.561 ACUTE PAIN OF RIGHT KNEE: ICD-10-CM
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
## 2020-01-22 DIAGNOSIS — Z01.818 PREOP GENERAL PHYSICAL EXAM: Primary | ICD-10-CM

## 2020-01-22 LAB — HGB BLD-MCNC: 13.3 G/DL (ref 11.7–15.7)

## 2020-01-22 PROCEDURE — 85018 HEMOGLOBIN: CPT | Performed by: PHYSICIAN ASSISTANT

## 2020-01-22 PROCEDURE — 99214 OFFICE O/P EST MOD 30 MIN: CPT | Performed by: PHYSICIAN ASSISTANT

## 2020-01-22 PROCEDURE — 36415 COLL VENOUS BLD VENIPUNCTURE: CPT | Performed by: PHYSICIAN ASSISTANT

## 2020-01-22 RX ORDER — OMEPRAZOLE 10 MG/1
20 CAPSULE, DELAYED RELEASE ORAL DAILY
COMMUNITY

## 2020-01-22 ASSESSMENT — MIFFLIN-ST. JEOR: SCORE: 1485.54

## 2020-01-27 ENCOUNTER — TRANSFERRED RECORDS (OUTPATIENT)
Dept: HEALTH INFORMATION MANAGEMENT | Facility: CLINIC | Age: 50
End: 2020-01-27

## 2020-01-29 LAB
CREAT SERPL-MCNC: 0.84 MG/DL (ref 0.57–1.11)
GFR SERPL CREATININE-BSD FRML MDRD: >60 ML/MIN/1.73M2
GLUCOSE SERPL-MCNC: 113 MG/DL (ref 65–100)
POTASSIUM SERPL-SCNC: 4.4 MMOL/L (ref 3.5–5)

## 2020-02-06 ENCOUNTER — MEDICAL CORRESPONDENCE (OUTPATIENT)
Dept: HEALTH INFORMATION MANAGEMENT | Facility: CLINIC | Age: 50
End: 2020-02-06

## 2020-02-09 ENCOUNTER — HEALTH MAINTENANCE LETTER (OUTPATIENT)
Age: 50
End: 2020-02-09

## 2020-02-09 ENCOUNTER — TELEPHONE (OUTPATIENT)
Dept: FAMILY MEDICINE | Facility: CLINIC | Age: 50
End: 2020-02-09

## 2020-02-09 DIAGNOSIS — N95.1 SYMPTOMATIC MENOPAUSAL OR FEMALE CLIMACTERIC STATES: ICD-10-CM

## 2020-02-10 RX ORDER — CITALOPRAM HYDROBROMIDE 20 MG/1
TABLET ORAL
Qty: 90 TABLET | Refills: 1 | Status: SHIPPED | OUTPATIENT
Start: 2020-02-10 | End: 2020-09-14

## 2020-02-10 NOTE — TELEPHONE ENCOUNTER
Pt is calling and states that She only has two days left of her celexa and she doesn't understand why she needs to be seen, (the bottle is telling her she needs to be seen), she states she was just in, ( for a pre-op)      Pt was very upset and verbally expressive in her displeasure.     Ayah Connor, [place]

## 2020-02-10 NOTE — TELEPHONE ENCOUNTER
"Requested Prescriptions   Pending Prescriptions Disp Refills     citalopram (CELEXA) 20 MG tablet [Pharmacy Med Name: Citalopram Hydrobromide Oral Tablet 20 MG] 30 tablet 0     Sig: TAKE ONE TABLET BY MOUTH ONE TIME DAILY  Last Written Prescription Date:  12/10/2019 #30 x 0  Last filled - not provided  Last office visit: 1/22/2020 PK Funes   Future Office Visit:  None       SSRIs Protocol Passed - 2/10/2020  2:36 PM  No flowsheet data found. (PHQ)    No flowsheet data found. (JONAH)              Passed - Recent (12 mo) or future (30 days) visit within the authorizing provider's specialty     Patient has had an office visit with the authorizing provider or a provider within the authorizing providers department within the previous 12 mos or has a future within next 30 days. See \"Patient Info\" tab in inbasket, or \"Choose Columns\" in Meds & Orders section of the refill encounter.              Passed - Medication is active on med list        Passed - Patient is age 18 or older        Passed - No active pregnancy on record        Passed - No positive pregnancy test in last 12 months          "

## 2020-02-11 ENCOUNTER — TRANSFERRED RECORDS (OUTPATIENT)
Dept: HEALTH INFORMATION MANAGEMENT | Facility: CLINIC | Age: 50
End: 2020-02-11

## 2020-02-11 ENCOUNTER — TELEPHONE (OUTPATIENT)
Dept: FAMILY MEDICINE | Facility: CLINIC | Age: 50
End: 2020-02-11

## 2020-02-11 NOTE — TELEPHONE ENCOUNTER
Please call Becky,   Her script has been refilled for 6 months (it was only filled for 30 days as another covering provider signed the script while I was out of clinic).  She takes this for menopausal symptoms (therefore PHQ-9 not necessary).     Bailee Funes PA-C

## 2020-02-11 NOTE — TELEPHONE ENCOUNTER
Leann called regarding citalopram refill.  This writer relayed message below- call was placed to number listed at [phone] today with no option tp leave voicemail.  Selena Epperson RN     10:41 PM   Note      Please call Becky,   Her script has been refilled for 6 months (it was only filled for 30 days as another covering provider signed the script while I was out of clinic).  She takes this for menopausal symptoms (therefore PHQ-9 not necessary).      Bailee Funes PA-C

## 2020-02-11 NOTE — TELEPHONE ENCOUNTER
Call placed to patient.  No voicemail option available to leave  Message per ESAU Funes.  No alternate phone number to call.  Patient has not used [employer].  Selena Epperson RN

## 2020-02-17 ENCOUNTER — MEDICAL CORRESPONDENCE (OUTPATIENT)
Dept: HEALTH INFORMATION MANAGEMENT | Facility: CLINIC | Age: 50
End: 2020-02-17

## 2020-03-03 ENCOUNTER — TRANSFERRED RECORDS (OUTPATIENT)
Dept: HEALTH INFORMATION MANAGEMENT | Facility: CLINIC | Age: 50
End: 2020-03-03

## 2020-03-10 ENCOUNTER — TRANSFERRED RECORDS (OUTPATIENT)
Dept: HEALTH INFORMATION MANAGEMENT | Facility: CLINIC | Age: 50
End: 2020-03-10

## 2020-03-24 ENCOUNTER — TRANSFERRED RECORDS (OUTPATIENT)
Dept: HEALTH INFORMATION MANAGEMENT | Facility: CLINIC | Age: 50
End: 2020-03-24

## 2020-05-01 ENCOUNTER — TELEPHONE (OUTPATIENT)
Dept: FAMILY MEDICINE | Facility: CLINIC | Age: 50
End: 2020-05-01

## 2020-05-01 NOTE — TELEPHONE ENCOUNTER
Patient  Thinks she has a bladder infection.    Valerie Downs [facility]

## 2020-05-01 NOTE — TELEPHONE ENCOUNTER
Left message on answering machine for patient to call back.    Patient can schedule telephone visit with Branden if needed.

## 2020-05-18 ENCOUNTER — NURSE TRIAGE (OUTPATIENT)
Dept: NURSING | Facility: CLINIC | Age: 50
End: 2020-05-18

## 2020-05-18 NOTE — TELEPHONE ENCOUNTER
Mother calling for daughter.    Has not felt well in 10 days.  Has diarrhea and stomach pain.    Nausea and ha. Tired. Diarrhea x 3 days. 3-4 stools yesterday.    No cough or fever. No Bm today.  Has not eaten today.    No vomiting. Just gave her tylenol for her HA.    Drinking water.    Mother asking for covid test.  No fever, cough or sob.    Has appt tomorrow for knee at ortho.    Advised should have phone visit today with provider to discuss if they think these symptoms are covid related or not.  Nurse does not think they are covid related as she has not had much symptoms at all in the past 24 hours.    Transferred to scheduling    Kaya Tesfaye RN  [facility] Nurse Advisor          Additional Information    Negative: Shock suspected (e.g., cold/pale/clammy skin, too weak to stand, low BP, rapid pulse)    Negative: Difficult to awaken or acting confused (e.g., disoriented, slurred speech)    Negative: Sounds like a life-threatening emergency to the triager    Negative: Vomiting also present and worse than the diarrhea    Negative: Blood in stool and without diarrhea    Negative: SEVERE abdominal pain (e.g., excruciating) and present > 1 hour    Negative: SEVERE abdominal pain and age > 60    Negative: Bloody, black, or tarry bowel movements    Negative: SEVERE diarrhea (e.g., 7 or more times / day more than normal) and age > 60 years    Negative: Constant abdominal pain lasting > 2 hours    Negative: Drinking very little and has signs of dehydration (e.g., no urine > 12 hours, very dry mouth, very lightheaded)    Negative: Patient sounds very sick or weak to the triager    Negative: SEVERE diarrhea (e.g., 7 or more times / day more than normal) and present > 24 hours (1 day)    Negative: MODERATE diarrhea (e.g., 4-6 times / day more than normal) and present > 48 hours (2 days)    Negative: MODERATE diarrhea (e.g., 4-6 times / day more than normal) and age > 70 years    Negative: Abdominal pain   (Exception: pain clears completely with each passage of diarrhea stool)    Negative: Fever > 101 F (38.3 C)    Negative: Blood in the stool    Negative: Mucus or pus in stool has been present > 2 days and diarrhea is more than mild    Negative: Weak immune system (e.g., HIV positive, cancer chemo, splenectomy, organ transplant, chronic steroids)    Negative: Travel to a foreign country in past month    Negative: Recent antibiotic therapy (i.e., within last 2 months) and diarrhea present > 3 days since antibiotic was stopped    Negative: Recent hospitalization and diarrhea present > 3 days    Negative: Tube feedings (e.g., nasogastric, g-tube, j-tube)    MILD diarrhea (e.g., 1-3 or more stools than normal in past 24 hours) diarrhea without known cause and present > 7 days    Protocols used: DIARRHEA-A-OH

## 2020-05-19 ENCOUNTER — VIRTUAL VISIT (OUTPATIENT)
Dept: FAMILY MEDICINE | Facility: CLINIC | Age: 50
End: 2020-05-19
Payer: COMMERCIAL

## 2020-05-19 DIAGNOSIS — R11.0 NAUSEA: Primary | ICD-10-CM

## 2020-05-19 DIAGNOSIS — R10.84 ABDOMINAL PAIN, GENERALIZED: ICD-10-CM

## 2020-05-19 PROCEDURE — 99213 OFFICE O/P EST LOW 20 MIN: CPT | Mod: 95 | Performed by: FAMILY MEDICINE

## 2020-05-19 NOTE — PROGRESS NOTES
"Becky Crane is a 49 year old female who is being evaluated via a billable telephone visit.      The patient has been notified of following:     \"This telephone visit will be conducted via a call between you and your physician/provider. We have found that certain health care needs can be provided without the need for a physical exam.  This service lets us provide the care you need with a short phone conversation.  If a prescription is necessary we can send it directly to your pharmacy.  If lab work is needed we can place an order for that and you can then stop by our lab to have the test done at a later time.    Telephone visits are billed at different rates depending on your insurance coverage. During this emergency period, for some insurers they may be billed the same as an in-person visit.  Please reach out to your insurance provider with any questions.    If during the course of the call the physician/provider feels a telephone visit is not appropriate, you will not be charged for this service.\"    Patient has given verbal consent for Telephone visit?  Yes    What phone number would you like to be contacted at? [phone]    How would you like to obtain your AVS? MyChart     1:39 PM      Subjective     Becky Crane is a 49 year old female who presents via phone visit today for the following health issues:    HPI  Abdominal Pain      Duration: beginning of May    Description (location/character/radiation): umbilical area       Associated flank pain: None    Intensity:  moderate    Accompanying signs and symptoms:        Fever/Chills: no        Gas/Bloating: YES- gas       Nausea/vomitting: YES- nausea       Diarrhea: YES- comes and goes       Dysuria or Hematuria: no     History (previous similar pain/trauma/previous testing): saw Bailee Funes July 2019    Precipitating or alleviating factors:       Pain worse with eating/BM/urination: no       Pain relieved by BM: no     Therapies tried and outcome: " "None    LMP:  not applicable    Has been having some abd discomfort for about 2 weeks.    Feels \"sore\", does not hurt when she pushes on it, just \"aches\".  Just states she \"does not feel like herself\".  Feels lack of motivation, like she \"might be coming down with something\".      Feels like she does not have much of an appetite.  Has been drinking Sprite and eating soda crackers since early May.  Thinks this might have helped.  States that she normally has a good appetite.      No vomiting but is nauseous at times.  Had a normal BM yesterday, no BM yet today.  Does have diarrhea sometimes, had 3 loose stools on Saturday.  Does generally have a bowel movement every day.    Had been off her medical cannabis in early May when symptoms first started, now back on but not sure when she restarted.       Had similar symptoms in July of 2019.  Eval including labs were normal at that time and symptoms resolved on their own.  She was advised to decrease her cannabis dose at that time and thinks it might have helped.      No fever but has had some headaches.  Had 1 headache yesterday and has 1 now, did take some tylenol yesterday and found it helpful.      Reviewed and updated as needed this visit by Provider  Tobacco  Allergies  Meds  Med Hx  Surg Hx  Fam Hx  Soc Hx        Review of Systems   Constitutional, HEENT, cardiovascular, pulmonary, gi and gu systems are negative, except as otherwise noted.       Objective   Reported vitals:  [facility] 12/13/2016 (Approximate)    healthy, alert and no distress  PSYCH: Alert and oriented times 3; coherent speech, normal   rate and volume, able to articulate logical thoughts, able   to abstract reason, no tangential thoughts, no hallucinations   or delusions  Her affect is normal  RESP: No cough, no audible wheezing, able to talk in full sentences  Remainder of exam unable to be completed due to telephone visits    Diagnostic Test Results:  Labs reviewed in Epic    "     Assessment/Plan:    ICD-10-CM    1. Nausea  R11.0    2. Abdominal pain, generalized  R10.84      Unclear etiology of symptoms at this time.  Pt with similar presentation in July 2019 with normal eval at that time.  Did decrease cannabis dosing and feels that was helpful.  Now has been back on her typical dosing.    Pt was having diarrhea which has since improved/resolved.  No vomiting.  Is eating although appetite is decreased.      No fever or chills, on discussion seems to be more nausea then pain, does not seem to be worsening.    Advised trial of decrease dose of cannabis since that seemed to help last time she had similar symptoms.      If symptoms are not improved/resolve by next week would advise face to face visit.  Pt verbalized understanding.    No follow-ups on file.    1:51 PM    Phone call duration:  12 minutes    Unique Cummings DO

## 2020-05-19 NOTE — PATIENT INSTRUCTIONS
Not sure the cause of your symptoms.  Good that the diarrhea has resolved.  Looks like you have similar symptoms in July of 2019 that improved when you cut back on the cannabis.  I think it is reasonable to give that a try again.    Please cut back on your cannabis dosing and let us know next week (Monday) if your symptoms are persisting.  If so, you'll need to see one of the providers face to face for evaluation.    If anything gets worse (fever, increased pain, vomiting) please seek care right away in the emergency room

## 2020-05-29 ENCOUNTER — TRANSFERRED RECORDS (OUTPATIENT)
Dept: HEALTH INFORMATION MANAGEMENT | Facility: CLINIC | Age: 50
End: 2020-05-29

## 2020-06-30 ENCOUNTER — TRANSFERRED RECORDS (OUTPATIENT)
Dept: HEALTH INFORMATION MANAGEMENT | Facility: CLINIC | Age: 50
End: 2020-06-30

## 2020-06-30 DIAGNOSIS — Z47.1 AFTERCARE FOLLOWING JOINT REPLACEMENT: Primary | ICD-10-CM

## 2020-06-30 LAB
CRP SERPL-MCNC: 8.9 MG/L (ref 0–8)
ERYTHROCYTE [SEDIMENTATION RATE] IN BLOOD BY WESTERGREN METHOD: 19 MM/H (ref 0–30)

## 2020-06-30 PROCEDURE — 86140 C-REACTIVE PROTEIN: CPT | Performed by: ORTHOPAEDIC SURGERY

## 2020-06-30 PROCEDURE — 36415 COLL VENOUS BLD VENIPUNCTURE: CPT | Performed by: ORTHOPAEDIC SURGERY

## 2020-06-30 PROCEDURE — 85652 RBC SED RATE AUTOMATED: CPT | Performed by: ORTHOPAEDIC SURGERY

## 2020-07-09 ENCOUNTER — HOSPITAL LABORATORY (OUTPATIENT)
Dept: OTHER | Facility: CLINIC | Age: 50
End: 2020-07-09

## 2020-07-09 LAB
APPEARANCE FLD: NORMAL
COLOR FLD: NORMAL
EOSINOPHIL NFR FLD MANUAL: 2 %
GRAM STN SPEC: NORMAL
LYMPHOCYTES NFR FLD MANUAL: 36 %
MONOS+MACROS NFR FLD MANUAL: 8 %
NEUTS BAND NFR FLD MANUAL: 54 %
SPECIMEN SOURCE FLD: NORMAL
SPECIMEN SOURCE: NORMAL
WBC # FLD AUTO: 857 /UL

## 2020-07-14 ENCOUNTER — TRANSFERRED RECORDS (OUTPATIENT)
Dept: HEALTH INFORMATION MANAGEMENT | Facility: CLINIC | Age: 50
End: 2020-07-14

## 2020-07-14 LAB
BACTERIA SPEC CULT: NO GROWTH
SPECIMEN SOURCE: NORMAL

## 2020-07-23 LAB
BACTERIA SPEC CULT: NORMAL
Lab: NORMAL
SPECIMEN SOURCE: NORMAL

## 2020-07-30 ENCOUNTER — TRANSFERRED RECORDS (OUTPATIENT)
Dept: HEALTH INFORMATION MANAGEMENT | Facility: CLINIC | Age: 50
End: 2020-07-30

## 2020-08-07 ENCOUNTER — TRANSFERRED RECORDS (OUTPATIENT)
Dept: HEALTH INFORMATION MANAGEMENT | Facility: CLINIC | Age: 50
End: 2020-08-07

## 2020-09-14 DIAGNOSIS — N95.1 SYMPTOMATIC MENOPAUSAL OR FEMALE CLIMACTERIC STATES: ICD-10-CM

## 2020-09-14 RX ORDER — CITALOPRAM HYDROBROMIDE 20 MG/1
20 TABLET ORAL DAILY
Qty: 90 TABLET | Refills: 1 | Status: SHIPPED | OUTPATIENT
Start: 2020-09-14 | End: 2021-02-02

## 2020-09-14 NOTE — TELEPHONE ENCOUNTER
Prescription approved per [facility] Refill Protocol.  Selena Epperson RN

## 2020-09-14 NOTE — TELEPHONE ENCOUNTER
patient's mom called and asked if the patients celexa can be filled today she will be out in a couple days and its not something she can just quite.    Valerie Downs [facility]

## 2020-10-01 ENCOUNTER — TRANSFERRED RECORDS (OUTPATIENT)
Dept: HEALTH INFORMATION MANAGEMENT | Facility: CLINIC | Age: 50
End: 2020-10-01

## 2020-11-03 ENCOUNTER — TRANSFERRED RECORDS (OUTPATIENT)
Dept: HEALTH INFORMATION MANAGEMENT | Facility: CLINIC | Age: 50
End: 2020-11-03

## 2020-11-08 ENCOUNTER — HEALTH MAINTENANCE LETTER (OUTPATIENT)
Age: 50
End: 2020-11-08

## 2020-12-16 ENCOUNTER — TRANSFERRED RECORDS (OUTPATIENT)
Dept: HEALTH INFORMATION MANAGEMENT | Facility: CLINIC | Age: 50
End: 2020-12-16

## 2021-01-15 ENCOUNTER — HEALTH MAINTENANCE LETTER (OUTPATIENT)
Age: 51
End: 2021-01-15

## 2021-01-30 DIAGNOSIS — N95.1 SYMPTOMATIC MENOPAUSAL OR FEMALE CLIMACTERIC STATES: ICD-10-CM

## 2021-02-02 RX ORDER — CITALOPRAM HYDROBROMIDE 20 MG/1
TABLET ORAL
Qty: 30 TABLET | Refills: 0 | Status: SHIPPED | OUTPATIENT
Start: 2021-02-02 | End: 2021-02-26

## 2021-02-02 NOTE — TELEPHONE ENCOUNTER
Routing refill request to provider for review/approval because:  Patient of Maricarmen Peoples who is no longer working for [employer].  Patient last seen by Maricarmen Peoples 6-17-19.  Will route to provider pool to advise.  Pended for #30 with need office visit for further refills.

## 2021-02-02 NOTE — TELEPHONE ENCOUNTER
Please call patient and schedule with new PCP for physical/refills. Short term refill provided.    Tanisha Otero PA-C on 2/2/2021 at 9:59 AM

## 2021-02-10 ENCOUNTER — TRANSFERRED RECORDS (OUTPATIENT)
Dept: HEALTH INFORMATION MANAGEMENT | Facility: CLINIC | Age: 51
End: 2021-02-10

## 2021-02-26 DIAGNOSIS — N95.1 SYMPTOMATIC MENOPAUSAL OR FEMALE CLIMACTERIC STATES: ICD-10-CM

## 2021-02-28 NOTE — TELEPHONE ENCOUNTER
Routing refill request to provider for review/approval because:  Patient needs to be seen because it has been more than 1 year since last office visit.  Last Seen by Marin on 1/22/20  Shelton no longer with [employer]    Medication pended for approval, 30 day supply with reminder.   2nd reminder

## 2021-03-01 RX ORDER — CITALOPRAM HYDROBROMIDE 20 MG/1
20 TABLET ORAL DAILY
Qty: 30 TABLET | Refills: 0 | Status: SHIPPED | OUTPATIENT
Start: 2021-03-01 | End: 2021-04-01

## 2021-03-28 ENCOUNTER — HEALTH MAINTENANCE LETTER (OUTPATIENT)
Age: 51
End: 2021-03-28

## 2021-03-31 DIAGNOSIS — N95.1 SYMPTOMATIC MENOPAUSAL OR FEMALE CLIMACTERIC STATES: ICD-10-CM

## 2021-04-01 RX ORDER — CITALOPRAM HYDROBROMIDE 20 MG/1
20 TABLET ORAL DAILY
Qty: 30 TABLET | Refills: 0 | Status: SHIPPED | OUTPATIENT
Start: 2021-04-01

## 2021-04-01 NOTE — TELEPHONE ENCOUNTER
Routing refill request to provider for review/approval because:  Patient of Maricarmen Peoples who has retired.  This is the 3 reminder for need for an appointment.  Patient last seen by Shelton on 6-17-19.  Will send to provider pool to advise.  Deny??

## 2021-05-04 ENCOUNTER — TRANSFERRED RECORDS (OUTPATIENT)
Dept: HEALTH INFORMATION MANAGEMENT | Facility: CLINIC | Age: 51
End: 2021-05-04

## 2021-05-25 ENCOUNTER — RECORDS - HEALTHEAST (OUTPATIENT)
Dept: ADMINISTRATIVE | Facility: CLINIC | Age: 51
End: 2021-05-25

## 2021-05-26 ENCOUNTER — RECORDS - HEALTHEAST (OUTPATIENT)
Dept: ADMINISTRATIVE | Facility: CLINIC | Age: 51
End: 2021-05-26

## 2021-05-27 ENCOUNTER — RECORDS - HEALTHEAST (OUTPATIENT)
Dept: ADMINISTRATIVE | Facility: CLINIC | Age: 51
End: 2021-05-27

## 2021-05-30 ENCOUNTER — RECORDS - HEALTHEAST (OUTPATIENT)
Dept: ADMINISTRATIVE | Facility: CLINIC | Age: 51
End: 2021-05-30

## 2021-06-22 ENCOUNTER — TRANSFERRED RECORDS (OUTPATIENT)
Dept: HEALTH INFORMATION MANAGEMENT | Facility: CLINIC | Age: 51
End: 2021-06-22

## 2021-06-22 LAB — PHQ9 SCORE: 18

## 2021-07-22 ENCOUNTER — TRANSFERRED RECORDS (OUTPATIENT)
Dept: HEALTH INFORMATION MANAGEMENT | Facility: CLINIC | Age: 51
End: 2021-07-22

## 2021-09-12 ENCOUNTER — HEALTH MAINTENANCE LETTER (OUTPATIENT)
Age: 51
End: 2021-09-12

## 2021-11-02 ENCOUNTER — TRANSFERRED RECORDS (OUTPATIENT)
Dept: HEALTH INFORMATION MANAGEMENT | Facility: CLINIC | Age: 51
End: 2021-11-02
Payer: COMMERCIAL

## 2022-02-26 ENCOUNTER — HEALTH MAINTENANCE LETTER (OUTPATIENT)
Age: 52
End: 2022-02-26

## 2022-03-31 ENCOUNTER — TRANSFERRED RECORDS (OUTPATIENT)
Dept: HEALTH INFORMATION MANAGEMENT | Facility: CLINIC | Age: 52
End: 2022-03-31
Payer: COMMERCIAL

## 2022-04-23 ENCOUNTER — HEALTH MAINTENANCE LETTER (OUTPATIENT)
Age: 52
End: 2022-04-23

## 2022-04-28 ENCOUNTER — TRANSFERRED RECORDS (OUTPATIENT)
Dept: HEALTH INFORMATION MANAGEMENT | Facility: CLINIC | Age: 52
End: 2022-04-28
Payer: COMMERCIAL

## 2022-05-24 ENCOUNTER — TRANSFERRED RECORDS (OUTPATIENT)
Dept: HEALTH INFORMATION MANAGEMENT | Facility: CLINIC | Age: 52
End: 2022-05-24
Payer: COMMERCIAL

## 2022-06-07 ENCOUNTER — TRANSFERRED RECORDS (OUTPATIENT)
Dept: HEALTH INFORMATION MANAGEMENT | Facility: CLINIC | Age: 52
End: 2022-06-07
Payer: COMMERCIAL

## 2022-07-12 ENCOUNTER — TRANSFERRED RECORDS (OUTPATIENT)
Dept: HEALTH INFORMATION MANAGEMENT | Facility: CLINIC | Age: 52
End: 2022-07-12

## 2022-08-30 ENCOUNTER — TRANSFERRED RECORDS (OUTPATIENT)
Dept: HEALTH INFORMATION MANAGEMENT | Facility: CLINIC | Age: 52
End: 2022-08-30

## 2022-09-27 ENCOUNTER — TRANSFERRED RECORDS (OUTPATIENT)
Dept: HEALTH INFORMATION MANAGEMENT | Facility: CLINIC | Age: 52
End: 2022-09-27

## 2022-10-30 ENCOUNTER — HEALTH MAINTENANCE LETTER (OUTPATIENT)
Age: 52
End: 2022-10-30

## 2023-01-17 ENCOUNTER — TRANSFERRED RECORDS (OUTPATIENT)
Dept: HEALTH INFORMATION MANAGEMENT | Facility: CLINIC | Age: 53
End: 2023-01-17
Payer: COMMERCIAL

## 2023-02-17 ENCOUNTER — TRANSFERRED RECORDS (OUTPATIENT)
Dept: HEALTH INFORMATION MANAGEMENT | Facility: CLINIC | Age: 53
End: 2023-02-17
Payer: COMMERCIAL

## 2023-03-31 ENCOUNTER — TRANSFERRED RECORDS (OUTPATIENT)
Dept: HEALTH INFORMATION MANAGEMENT | Facility: CLINIC | Age: 53
End: 2023-03-31
Payer: COMMERCIAL

## 2023-04-08 ENCOUNTER — HEALTH MAINTENANCE LETTER (OUTPATIENT)
Age: 53
End: 2023-04-08

## 2023-04-10 ENCOUNTER — TELEPHONE (OUTPATIENT)
Dept: OTHER | Facility: CLINIC | Age: 53
End: 2023-04-10
Payer: COMMERCIAL

## 2023-04-18 ENCOUNTER — TRANSFERRED RECORDS (OUTPATIENT)
Dept: HEALTH INFORMATION MANAGEMENT | Facility: CLINIC | Age: 53
End: 2023-04-18
Payer: COMMERCIAL

## 2023-06-01 ENCOUNTER — HEALTH MAINTENANCE LETTER (OUTPATIENT)
Age: 53
End: 2023-06-01

## 2023-07-25 ENCOUNTER — TRANSFERRED RECORDS (OUTPATIENT)
Dept: HEALTH INFORMATION MANAGEMENT | Facility: CLINIC | Age: 53
End: 2023-07-25
Payer: COMMERCIAL

## 2024-02-09 ENCOUNTER — TRANSFERRED RECORDS (OUTPATIENT)
Dept: HEALTH INFORMATION MANAGEMENT | Facility: CLINIC | Age: 54
End: 2024-02-09
Payer: COMMERCIAL

## 2024-03-31 ENCOUNTER — HEALTH MAINTENANCE LETTER (OUTPATIENT)
Age: 54
End: 2024-03-31

## 2024-05-10 ENCOUNTER — TRANSFERRED RECORDS (OUTPATIENT)
Dept: HEALTH INFORMATION MANAGEMENT | Facility: CLINIC | Age: 54
End: 2024-05-10
Payer: COMMERCIAL

## 2024-06-09 ENCOUNTER — HEALTH MAINTENANCE LETTER (OUTPATIENT)
Age: 54
End: 2024-06-09

## 2025-05-27 ENCOUNTER — TRANSFERRED RECORDS (OUTPATIENT)
Dept: HEALTH INFORMATION MANAGEMENT | Facility: CLINIC | Age: 55
End: 2025-05-27
Payer: COMMERCIAL